# Patient Record
Sex: MALE | Race: WHITE | NOT HISPANIC OR LATINO | Employment: FULL TIME | URBAN - METROPOLITAN AREA
[De-identification: names, ages, dates, MRNs, and addresses within clinical notes are randomized per-mention and may not be internally consistent; named-entity substitution may affect disease eponyms.]

---

## 2018-01-03 ENCOUNTER — OFFICE VISIT (OUTPATIENT)
Dept: INTERNAL MEDICINE | Facility: CLINIC | Age: 23
End: 2018-01-03

## 2018-01-03 ENCOUNTER — TRANSCRIBE ORDERS (OUTPATIENT)
Dept: ADMINISTRATIVE | Facility: HOSPITAL | Age: 23
End: 2018-01-03

## 2018-01-03 ENCOUNTER — APPOINTMENT (OUTPATIENT)
Dept: LAB | Facility: HOSPITAL | Age: 23
End: 2018-01-03

## 2018-01-03 VITALS
WEIGHT: 243.1 LBS | HEIGHT: 75 IN | SYSTOLIC BLOOD PRESSURE: 128 MMHG | OXYGEN SATURATION: 98 % | DIASTOLIC BLOOD PRESSURE: 78 MMHG | HEART RATE: 76 BPM | BODY MASS INDEX: 30.23 KG/M2

## 2018-01-03 DIAGNOSIS — F17.210 CIGARETTE NICOTINE DEPENDENCE WITHOUT COMPLICATION: ICD-10-CM

## 2018-01-03 DIAGNOSIS — Z00.00 VISIT FOR WELL MAN HEALTH CHECK: Primary | ICD-10-CM

## 2018-01-03 DIAGNOSIS — Z72.51 HIGH RISK SEXUAL BEHAVIOR: ICD-10-CM

## 2018-01-03 DIAGNOSIS — Z13.220 SCREENING FOR LIPID DISORDERS: ICD-10-CM

## 2018-01-03 DIAGNOSIS — F31.81 BIPOLAR II DISORDER (HCC): Primary | ICD-10-CM

## 2018-01-03 DIAGNOSIS — Z13.29 SCREENING FOR THYROID DISORDER: ICD-10-CM

## 2018-01-03 DIAGNOSIS — Z71.6 ENCOUNTER FOR SMOKING CESSATION COUNSELING: ICD-10-CM

## 2018-01-03 DIAGNOSIS — Z13.21 ENCOUNTER FOR VITAMIN DEFICIENCY SCREENING: ICD-10-CM

## 2018-01-03 DIAGNOSIS — Z13.1 SCREENING FOR DIABETES MELLITUS: ICD-10-CM

## 2018-01-03 PROBLEM — F17.200 NICOTINE DEPENDENCE: Status: ACTIVE | Noted: 2018-01-03

## 2018-01-03 PROCEDURE — 99385 PREV VISIT NEW AGE 18-39: CPT | Performed by: FAMILY MEDICINE

## 2018-01-03 PROCEDURE — 99407 BEHAV CHNG SMOKING > 10 MIN: CPT | Performed by: FAMILY MEDICINE

## 2018-01-03 RX ORDER — LAMOTRIGINE 150 MG/1
150 TABLET ORAL DAILY
COMMUNITY

## 2018-01-03 RX ORDER — LURASIDONE HYDROCHLORIDE 40 MG/1
1 TABLET, FILM COATED ORAL DAILY
COMMUNITY

## 2018-01-03 NOTE — PROGRESS NOTES
Subjective   Levon Long is a 22 y.o. male and is here for a comprehensive physical exam. The patient reports no problems.    Patient does have concern for high risk sexual behavior.  Patient states that he's had multiple sexual partners.  He states that his ex-girlfriend did have positive chlamydia.  Patient at this time does not have any signs and symptoms of discharge.    Do you take any herbs or supplements that were not prescribed by a doctor? no      Social History:   Social History     Social History   • Marital status: Single     Spouse name: N/A   • Number of children: N/A   • Years of education: N/A     Occupational History   • Not on file.     Social History Main Topics   • Smoking status: Current Every Day Smoker     Packs/day: 0.50   • Smokeless tobacco: Never Used      Comment: since age 21   • Alcohol use Yes      Comment: 7-10/ week   • Drug use: No   • Sexual activity: Yes     Partners: Female     Other Topics Concern   • Not on file     Social History Narrative   • No narrative on file       Family History:   Family History   Problem Relation Age of Onset   • Anxiety disorder Mother    • Alcohol abuse Mother    • Anxiety disorder Father    • Anxiety disorder Maternal Grandmother        Past Medical History:   Past Medical History:   Diagnosis Date   • Anxiety    • Bipolar disorder    • Depression            Review of Systems    Review of Systems   Constitutional: Negative for chills and fever.   HENT: Negative for congestion, rhinorrhea, sinus pain and sore throat.    Eyes: Negative for photophobia and visual disturbance.   Respiratory: Negative for cough, chest tightness and shortness of breath.    Cardiovascular: Negative for chest pain and palpitations.   Gastrointestinal: Negative for diarrhea, nausea and vomiting.   Genitourinary: Negative for dysuria, frequency and urgency.   Skin: Negative for rash and wound.   Neurological: Negative for dizziness and syncope.   Psychiatric/Behavioral:  Negative for behavioral problems and confusion.       Objective   Physical Exam   Constitutional: He is oriented to person, place, and time. He appears well-developed and well-nourished.   HENT:   Head: Normocephalic and atraumatic.   Right Ear: External ear normal.   Left Ear: External ear normal.   Mouth/Throat: Oropharynx is clear and moist.   Eyes: EOM are normal.   Neck: Normal range of motion. Neck supple.   Cardiovascular: Normal rate, regular rhythm and normal heart sounds.    Pulmonary/Chest: Effort normal and breath sounds normal. No respiratory distress.   Abdominal: Soft. There is no tenderness. There is no guarding.   Musculoskeletal: Normal range of motion.   Lymphadenopathy:     He has no cervical adenopathy.   Neurological: He is alert and oriented to person, place, and time.   Skin: Skin is warm.   Psychiatric: He has a normal mood and affect. His behavior is normal.   Nursing note and vitals reviewed.      Medications:   Current Outpatient Prescriptions:   •  lamoTRIgine (LAMICTAL) 150 MG tablet, Take 150 mg by mouth Daily., Disp: , Rfl:   •  Lurasidone HCl (LATUDA) 80 MG tablet, Take 1 tablet by mouth Daily., Disp: , Rfl:        Assessment/Plan   Healthy male exam.     1. Healthcare Maintenance:  2. Patient Counseling:  --Nutrition: Stressed importance of moderation in sodium/caffeine intake, saturated fat and cholesterol, caloric balance, sufficient intake of fresh fruits, vegetables, fiber, calcium and vit D  --Exercise: Recommended patient to exercise daily for 30 minutes.  Patient does frequent the gym regularly.  --Immunizations reviewed.  Patient had a TdaP sometime in 2017.  Patient has declined flu shot.  --I have counseled patient about high risk sexual behavior.  He has been with multiple sexual partners over the last month.  He has not always used protection.  He also stated that his ex girlfriend did test positive for chlamydia.  Patient does not have any signs and symptoms of any STDs  "at this time.    --\"I advised the patient of the risks in continuing to use tobacco,\" and \"I provided this patient with smoking cessation educational materials and discussed how to quit smoking,\" and \"patient has expressed the willingness to quit\".  I have spent greater than 10 minutes with the patient and counseling on stopping to smoke.  Patient is interested in stopping to smoke, but stated that he would like to try on his own.  Patient is distended and using e-cigs to help him stop smoking.      Diagnoses and all orders for this visit:    Visit for well man health check  -     Comprehensive Metabolic Panel  -     CBC & Differential    Screening for lipid disorders  -     Lipid Panel With LDL / HDL Ratio    Screening for diabetes mellitus    Screening for thyroid disorder  -     Thyroid Panel With TSH    High risk sexual behavior  -     Hepatitis C RNA, quantitative, PCR (graph)  -     Hepatitis panel, acute  -     HIV 2 Antibody Screen w reflex  -     HSV 1 and 2 IgM, Abs, Indirect  -     HSV 1 and 2-Specific Ab, IgG  -     RPR  -     Chlamydia trachomatis, Neisseria gonorrhoeae, PCR w/ confirmation - Swab, Urine, Clean Catch    Encounter for vitamin deficiency screening  -     Vitamin D 25 Hydroxy    Cigarette nicotine dependence without complication    Encounter for smoking cessation counseling    Other orders  -     Lurasidone HCl (LATUDA) 80 MG tablet; Take 1 tablet by mouth Daily.  -     lamoTRIgine (LAMICTAL) 150 MG tablet; Take 150 mg by mouth Daily.        No Follow-up on file. follow-up in 6 months.             "

## 2018-01-08 LAB
25(OH)D3+25(OH)D2 SERPL-MCNC: 22.1 NG/ML (ref 30–100)
ALBUMIN SERPL-MCNC: 5.2 G/DL (ref 3.5–5.2)
ALBUMIN/GLOB SERPL: 1.9 G/DL
ALP SERPL-CCNC: 38 U/L (ref 39–117)
ALT SERPL-CCNC: 15 U/L (ref 1–41)
AST SERPL-CCNC: 19 U/L (ref 1–40)
BASOPHILS # BLD AUTO: 0.04 10*3/MM3 (ref 0–0.2)
BASOPHILS NFR BLD AUTO: 0.4 % (ref 0–1.5)
BILIRUB SERPL-MCNC: 0.4 MG/DL (ref 0.1–1.2)
BUN SERPL-MCNC: 14 MG/DL (ref 6–20)
BUN/CREAT SERPL: 11 (ref 7–25)
C TRACH RRNA SPEC QL NAA+PROBE: NEGATIVE
CALCIUM SERPL-MCNC: 11 MG/DL (ref 8.6–10.5)
CHLORIDE SERPL-SCNC: 96 MMOL/L (ref 98–107)
CHOLEST SERPL-MCNC: 186 MG/DL (ref 0–200)
CO2 SERPL-SCNC: 28.5 MMOL/L (ref 22–29)
CREAT SERPL-MCNC: 1.27 MG/DL (ref 0.76–1.27)
EOSINOPHIL # BLD AUTO: 0.19 10*3/MM3 (ref 0–0.7)
EOSINOPHIL NFR BLD AUTO: 1.9 % (ref 0.3–6.2)
ERYTHROCYTE [DISTWIDTH] IN BLOOD BY AUTOMATED COUNT: 12.9 % (ref 11.5–14.5)
FT4I SERPL CALC-MCNC: 1.4 (ref 1.2–4.9)
GLOBULIN SER CALC-MCNC: 2.8 GM/DL
GLUCOSE SERPL-MCNC: 81 MG/DL (ref 65–99)
HAV IGM SERPL QL IA: NEGATIVE
HBV CORE IGM SERPL QL IA: NEGATIVE
HBV SURFACE AG SERPL QL IA: NEGATIVE
HCT VFR BLD AUTO: 45.3 % (ref 40.4–52.2)
HCV AB S/CO SERPL IA: 0.1 S/CO RATIO (ref 0–0.9)
HCV RNA SERPL NAA+PROBE-ACNC: NORMAL IU/ML
HDLC SERPL-MCNC: 74 MG/DL (ref 40–60)
HGB BLD-MCNC: 14.7 G/DL (ref 13.7–17.6)
HIV 2 AB SERPL QL IA: NEGATIVE
HSV1 IGG SER IA-ACNC: <0.91 INDEX (ref 0–0.9)
HSV1 IGM TITR SER IF: NORMAL TITER
HSV2 IGG SER IA-ACNC: <0.91 INDEX (ref 0–0.9)
HSV2 IGM TITR SER IF: NORMAL TITER
IMM GRANULOCYTES # BLD: 0.03 10*3/MM3 (ref 0–0.03)
IMM GRANULOCYTES NFR BLD: 0.3 % (ref 0–0.5)
LDLC SERPL CALC-MCNC: 94 MG/DL (ref 0–100)
LDLC/HDLC SERPL: 1.27 {RATIO}
LYMPHOCYTES # BLD AUTO: 1.7 10*3/MM3 (ref 0.9–4.8)
LYMPHOCYTES NFR BLD AUTO: 16.7 % (ref 19.6–45.3)
MCH RBC QN AUTO: 30.2 PG (ref 27–32.7)
MCHC RBC AUTO-ENTMCNC: 32.5 G/DL (ref 32.6–36.4)
MCV RBC AUTO: 93 FL (ref 79.8–96.2)
MONOCYTES # BLD AUTO: 0.76 10*3/MM3 (ref 0.2–1.2)
MONOCYTES NFR BLD AUTO: 7.5 % (ref 5–12)
N GONORRHOEA RRNA SPEC QL NAA+PROBE: NEGATIVE
NEUTROPHILS # BLD AUTO: 7.44 10*3/MM3 (ref 1.9–8.1)
NEUTROPHILS NFR BLD AUTO: 73.2 % (ref 42.7–76)
PLATELET # BLD AUTO: 264 10*3/MM3 (ref 140–500)
POTASSIUM SERPL-SCNC: 4.5 MMOL/L (ref 3.5–5.2)
PROT SERPL-MCNC: 8 G/DL (ref 6–8.5)
RBC # BLD AUTO: 4.87 10*6/MM3 (ref 4.6–6)
RPR SER QL: NORMAL
SODIUM SERPL-SCNC: 137 MMOL/L (ref 136–145)
T3RU NFR SERPL: 25 % (ref 24–39)
T4 SERPL-MCNC: 5.7 UG/DL (ref 4.5–12)
TEST INFORMATION: NORMAL
TRIGL SERPL-MCNC: 89 MG/DL (ref 0–150)
TSH SERPL DL<=0.005 MIU/L-ACNC: 1.27 UIU/ML (ref 0.45–4.5)
VLDLC SERPL CALC-MCNC: 17.8 MG/DL (ref 5–40)
WBC # BLD AUTO: 10.16 10*3/MM3 (ref 4.5–10.7)

## 2018-01-09 ENCOUNTER — TELEPHONE (OUTPATIENT)
Dept: INTERNAL MEDICINE | Facility: CLINIC | Age: 23
End: 2018-01-09

## 2018-01-09 DIAGNOSIS — E83.52 SERUM CALCIUM ELEVATED: Primary | ICD-10-CM

## 2018-01-09 RX ORDER — ACETAMINOPHEN 160 MG
2000 TABLET,DISINTEGRATING ORAL DAILY
Qty: 90 CAPSULE | Refills: 1 | Status: SHIPPED | OUTPATIENT
Start: 2018-01-09

## 2018-01-09 NOTE — TELEPHONE ENCOUNTER
----- Message from Angel Styles MD sent at 1/9/2018  9:32 AM EST -----  Please inform the patient of the following abnormal results.  Hepatitis C is negative.  Hepatitis panel is negative.  HIV is negative.  Herpes simplex virus 1 and 2 are negative.  HSV 1 and 2 IgG which would indicate an old infection are both negative as well.  Syphilis is negative.  Chlamydia and gonococcal are negative.  Patient's lipid panels within normal limits.  Patient's calcium slightly elevated, is unclear exactly what elevated.  We'll need to get a CMP, and ionized calcium levels in 2 weeks.  Patient's CBC is elevated.

## 2018-01-09 NOTE — TELEPHONE ENCOUNTER
----- Message from Angel Styles MD sent at 1/9/2018  9:53 AM EST -----  Please inform the patient of the following abnormal results.  Patient's vitamin D is also low.  He will need to take 2000 IUs of vitamin D daily.

## 2018-01-09 NOTE — PROGRESS NOTES
Please inform the patient of the following abnormal results.  Patient's vitamin D is also low.  He will need to take 2000 IUs of vitamin D daily.

## 2018-01-09 NOTE — TELEPHONE ENCOUNTER
LVM- patient notified. Patient advised to contact office if they have any questions. Prescription sent to pharmacy.

## 2018-01-09 NOTE — TELEPHONE ENCOUNTER
Patient notified and voiced understanding. Patient advised to contact office if they have any questions. Lab appointment scheduled for two weeks and lab orders put into EPIC per Dr. Styles.

## 2018-01-09 NOTE — PROGRESS NOTES
Please inform the patient of the following abnormal results.  Hepatitis C is negative.  Hepatitis panel is negative.  HIV is negative.  Herpes simplex virus 1 and 2 are negative.  HSV 1 and 2 IgG which would indicate an old infection are both negative as well.  Syphilis is negative.  Chlamydia and gonococcal are negative.  Patient's lipid panels within normal limits.  Patient's calcium slightly elevated, is unclear exactly what elevated.  We'll need to get a CMP, and ionized calcium levels in 2 weeks.  Patient's CBC is elevated.

## 2018-01-23 ENCOUNTER — RESULTS ENCOUNTER (OUTPATIENT)
Dept: INTERNAL MEDICINE | Facility: CLINIC | Age: 23
End: 2018-01-23

## 2018-01-23 DIAGNOSIS — E83.52 SERUM CALCIUM ELEVATED: ICD-10-CM

## 2018-01-24 LAB
ALBUMIN SERPL-MCNC: 4.7 G/DL (ref 3.5–5.2)
ALBUMIN/GLOB SERPL: 1.7 G/DL
ALP SERPL-CCNC: 34 U/L (ref 39–117)
ALT SERPL-CCNC: 17 U/L (ref 1–41)
AST SERPL-CCNC: 18 U/L (ref 1–40)
BILIRUB SERPL-MCNC: 0.2 MG/DL (ref 0.1–1.2)
BUN SERPL-MCNC: 12 MG/DL (ref 6–20)
BUN/CREAT SERPL: 10.2 (ref 7–25)
CA-I SERPL ISE-MCNC: 6.1 MG/DL (ref 4.5–5.6)
CALCIUM SERPL-MCNC: 11 MG/DL (ref 8.6–10.5)
CHLORIDE SERPL-SCNC: 101 MMOL/L (ref 98–107)
CO2 SERPL-SCNC: 28.7 MMOL/L (ref 22–29)
CREAT SERPL-MCNC: 1.18 MG/DL (ref 0.76–1.27)
GFR SERPLBLD CREATININE-BSD FMLA CKD-EPI: 77 ML/MIN/1.73
GFR SERPLBLD CREATININE-BSD FMLA CKD-EPI: 94 ML/MIN/1.73
GLOBULIN SER CALC-MCNC: 2.7 GM/DL
GLUCOSE SERPL-MCNC: 92 MG/DL (ref 65–99)
POTASSIUM SERPL-SCNC: 5 MMOL/L (ref 3.5–5.2)
PROT SERPL-MCNC: 7.4 G/DL (ref 6–8.5)
SODIUM SERPL-SCNC: 142 MMOL/L (ref 136–145)

## 2018-01-25 NOTE — PROGRESS NOTES
Please inform the patient of the following abnormal results.  Patient's calcium and total ionized calcium continues to elevated.   Will need to repeat CMP in 2 wks, will need ionized calcium, PTH and rPTH levels as well.

## 2018-01-26 ENCOUNTER — TELEPHONE (OUTPATIENT)
Dept: INTERNAL MEDICINE | Facility: CLINIC | Age: 23
End: 2018-01-26

## 2018-01-26 DIAGNOSIS — E83.52 SERUM CALCIUM ELEVATED: Primary | ICD-10-CM

## 2018-01-26 NOTE — TELEPHONE ENCOUNTER
Patient notified and voiced understanding. Patient advised to contact office if they have any questions. Lab appointment scheduled for two weeks. Lab orders put into EPIC.

## 2018-01-26 NOTE — TELEPHONE ENCOUNTER
----- Message from Angel Styles MD sent at 1/25/2018  6:16 PM EST -----  Please inform the patient of the following abnormal results.  Patient's calcium and total ionized calcium continues to elevated.   Will need to repeat CMP in 2 wks, will need ionized calcium, PTH and rPTH levels as well.

## 2018-02-09 ENCOUNTER — RESULTS ENCOUNTER (OUTPATIENT)
Dept: INTERNAL MEDICINE | Facility: CLINIC | Age: 23
End: 2018-02-09

## 2018-02-09 DIAGNOSIS — E83.52 SERUM CALCIUM ELEVATED: ICD-10-CM

## 2018-09-04 ENCOUNTER — TELEPHONE (OUTPATIENT)
Dept: INTERNAL MEDICINE | Facility: CLINIC | Age: 23
End: 2018-09-04

## 2018-09-04 DIAGNOSIS — Z72.51 HIGH RISK SEXUAL BEHAVIOR: Primary | ICD-10-CM

## 2018-09-04 NOTE — TELEPHONE ENCOUNTER
Patient called requesting blood work orders for STD screenings. Please advise on what should be ordered.

## 2018-09-04 NOTE — TELEPHONE ENCOUNTER
Patient notified. Lab appointment scheduled. Lab orders put into EPIC. Patient advised to schedule appointment once his work schedule is released.

## 2018-09-06 ENCOUNTER — RESULTS ENCOUNTER (OUTPATIENT)
Dept: INTERNAL MEDICINE | Facility: CLINIC | Age: 23
End: 2018-09-06

## 2018-09-06 DIAGNOSIS — Z72.51 HIGH RISK SEXUAL BEHAVIOR: ICD-10-CM

## 2018-09-11 LAB
C TRACH RRNA SPEC QL NAA+PROBE: NEGATIVE
HAV IGM SERPL QL IA: NEGATIVE
HBV CORE IGM SERPL QL IA: NEGATIVE
HBV SURFACE AG SERPL QL IA: NEGATIVE
HCV AB S/CO SERPL IA: <0.1 S/CO RATIO (ref 0–0.9)
HCV RNA SERPL NAA+PROBE-ACNC: NORMAL IU/ML
HIV 2 AB SERPL QL IA: NEGATIVE
HSV1 IGG SER IA-ACNC: <0.91 INDEX (ref 0–0.9)
HSV1 IGM TITR SER IF: NORMAL TITER
HSV2 IGG SER IA-ACNC: <0.91 INDEX (ref 0–0.9)
HSV2 IGM TITR SER IF: NORMAL TITER
N GONORRHOEA RRNA SPEC QL NAA+PROBE: NEGATIVE
RPR SER QL: NON REACTIVE
TEST INFORMATION: NORMAL

## 2018-09-14 ENCOUNTER — TELEPHONE (OUTPATIENT)
Dept: INTERNAL MEDICINE | Facility: CLINIC | Age: 23
End: 2018-09-14

## 2018-09-14 NOTE — TELEPHONE ENCOUNTER
----- Message from Angel Styles MD sent at 9/14/2018  9:52 AM EDT -----  The labs were reviewed. Please inform patient that labs were normal.

## 2019-09-14 ENCOUNTER — HOSPITAL ENCOUNTER (EMERGENCY)
Facility: HOSPITAL | Age: 24
Discharge: HOME | End: 2019-09-14
Attending: EMERGENCY MEDICINE | Admitting: EMERGENCY MEDICINE

## 2019-09-14 VITALS
OXYGEN SATURATION: 100 % | RESPIRATION RATE: 16 BRPM | WEIGHT: 250 LBS | SYSTOLIC BLOOD PRESSURE: 125 MMHG | BODY MASS INDEX: 31.08 KG/M2 | TEMPERATURE: 97.9 F | HEART RATE: 56 BPM | HEIGHT: 75 IN | DIASTOLIC BLOOD PRESSURE: 77 MMHG

## 2019-09-14 DIAGNOSIS — S91.331A PUNCTURE WOUND OF RIGHT FOOT EXCLUDING TOES WITH INFECTION, INITIAL ENCOUNTER: Primary | ICD-10-CM

## 2019-09-14 DIAGNOSIS — L08.9 PUNCTURE WOUND OF RIGHT FOOT EXCLUDING TOES WITH INFECTION, INITIAL ENCOUNTER: Primary | ICD-10-CM

## 2019-09-14 PROCEDURE — 87070 CULTURE OTHR SPECIMN AEROBIC: CPT | Performed by: PHYSICIAN ASSISTANT

## 2019-09-14 PROCEDURE — 87205 SMEAR GRAM STAIN: CPT | Performed by: PHYSICIAN ASSISTANT

## 2019-09-14 PROCEDURE — 99282 EMERGENCY DEPT VISIT SF MDM: CPT | Performed by: PHYSICIAN ASSISTANT

## 2019-09-14 PROCEDURE — 87186 SC STD MICRODIL/AGAR DIL: CPT | Performed by: PHYSICIAN ASSISTANT

## 2019-09-14 PROCEDURE — 99283 EMERGENCY DEPT VISIT LOW MDM: CPT

## 2019-09-14 RX ORDER — CLINDAMYCIN HYDROCHLORIDE 300 MG/1
300 CAPSULE ORAL 4 TIMES DAILY
COMMUNITY
Start: 2019-09-11

## 2019-09-14 NOTE — ED PROVIDER NOTES
EMERGENCY DEPARTMENT ENCOUNTER      Room Number: 6/06    History is provided by the patient, no translation services needed    HPI:    Chief complaint: Infected puncture wound    Location: Between fourth and fifth toes on right foot    Quality/Severity: Mild to moderate pain    Timing/Duration: Initial injury occurred on Sunday    Modifying Factors: Began taking clindamycin on 9/11/2019, states redness has improved.    Associated Symptoms: Positive for wound on right foot, with surrounding erythema and drainage.  Patient denies any fevers, chills, myalgias, fatigue, nausea, vomiting.    Narrative: Pt is a 24 y.o. male who presents complaining of a wound between his fourth and fifth toes on his right foot that occurred on Sunday.  Patient states he was in the [facility], and something punctured the skin between his fourth and fifth toes.  He states he was seen in urgent care on 9/11/2019, had an x-ray, and had purulent drainage and erythema extending up his foot at that time.  He was started on the 21-day course of clindamycin, and has been taking it as directed 4 times daily, and has also been applying mupirocin ointment 3 times daily.  He states today he had an increased amount of purulent drainage, as well as bloody drainage from his wound.  He states he presented to an urgent care prior to arrival, and the provider told him they were concerned for streaking in his foot and to come to the ED.  Patient states upon arrival at ED, his foot appears normal to him.       PMD: Angel Styles MD    REVIEW OF SYSTEMS  Review of Systems   Constitutional: Negative for chills and fever.   Respiratory: Negative for cough and shortness of breath.    Cardiovascular: Negative for chest pain and palpitations.   Gastrointestinal: Negative for nausea and vomiting.   Genitourinary: Negative for difficulty urinating and urgency.   Musculoskeletal: Negative for gait problem and joint swelling.   Skin: Positive for color change and  wound.   Allergic/Immunologic: Negative for immunocompromised state.   Neurological: Negative for dizziness, syncope and headaches.   Psychiatric/Behavioral: Negative for confusion. The patient is not nervous/anxious.            PAST MEDICAL HISTORY  Active Ambulatory Problems     Diagnosis Date Noted   • High risk sexual behavior 01/03/2018   • Nicotine dependence 01/03/2018     Resolved Ambulatory Problems     Diagnosis Date Noted   • No Resolved Ambulatory Problems     Past Medical History:   Diagnosis Date   • Anxiety    • Bipolar disorder (CMS/HCC)    • Depression        PAST SURGICAL HISTORY  History reviewed. No pertinent surgical history.    FAMILY HISTORY  Family History   Problem Relation Age of Onset   • Anxiety disorder Mother    • Alcohol abuse Mother    • Anxiety disorder Father    • Anxiety disorder Maternal Grandmother        SOCIAL HISTORY  Social History     Socioeconomic History   • Marital status: Single     Spouse name: Not on file   • Number of children: Not on file   • Years of education: Not on file   • Highest education level: Not on file   Tobacco Use   • Smoking status: Current Every Day Smoker     Packs/day: 0.50   • Smokeless tobacco: Never Used   • Tobacco comment: since age 21   Substance and Sexual Activity   • Alcohol use: Yes     Comment: 7-10/ week   • Drug use: No   • Sexual activity: Yes     Partners: Female       ALLERGIES  Patient has no known allergies.    No current facility-administered medications for this encounter.     Current Outpatient Medications:   •  Cholecalciferol (VITAMIN D3) 2000 units capsule, Take 1 capsule by mouth Daily., Disp: 90 capsule, Rfl: 1  •  clindamycin (CLEOCIN) 300 MG capsule, Take 300 mg by mouth 4 (Four) Times a Day., Disp: , Rfl:   •  lurasidone (LATUDA) 40 MG tablet tablet, Take 1 tablet by mouth Daily. Currently weaning off per Dr. Giang, Disp: , Rfl:   •  mupirocin (BACTROBAN) 2 % ointment, Apply  topically to the appropriate area as directed  3 (Three) Times a Day., Disp: , Rfl:   •  lamoTRIgine (LAMICTAL) 150 MG tablet, Take 150 mg by mouth Daily., Disp: , Rfl:     PHYSICAL EXAM  ED Triage Vitals [09/14/19 1127]   Temp Heart Rate Resp BP SpO2   97.9 °F (36.6 °C) 56 16 125/77 100 %      Temp src Heart Rate Source Patient Position BP Location FiO2 (%)   Oral Monitor Sitting Right arm --       Physical Exam   Constitutional: He is oriented to person, place, and time and well-developed, well-nourished, and in no distress.   HENT:   Head: Normocephalic and atraumatic.   Mouth/Throat: Oropharynx is clear and moist.   Cardiovascular: Normal rate and regular rhythm.   Pulmonary/Chest: Effort normal and breath sounds normal.   Musculoskeletal: Normal range of motion. He exhibits tenderness (Tenderness between fourth and fifth digits on right foot). He exhibits no edema.        Feet:    Feet appear blotchy bilaterally, there is no red streaking present, there is localized erythema surrounding patient's wound between his fourth and fifth toes on his right foot.  There is no sign of cellulitis at this time.   Neurological: He is alert and oriented to person, place, and time. Gait normal.   Skin: Skin is warm and dry.   Psychiatric: Mood, memory, affect and judgment normal.         LAB RESULTS        I ordered the above labs and reviewed the results    RADIOLOGY  No results found.    I ordered the above radiologic testing and reviewed the results    PROCEDURES  Procedures      PROGRESS AND CONSULTS  ED Course as of Sep 15 1739   Sat Sep 14, 2019   1307 I discussed with patient that he may continue taking the clindamycin as it seems to be helping his infection improve.  I discussed that he may also do warm Betadine soaks to help draw out any excess pus.  We will culture his wound and refer him to podiatry, I have instructed him to call podiatry on Monday to be seen for this upcoming week.  Instructed patient to continue wound care and antibiotic therapy as previously  instructed.  I discussed if he has any worsening redness or swelling in his foot that he should return to the ED.  Patient verbalizes understanding and is agreeable with this plan.  [KS]   Sun Sep 15, 2019   1622 Patient's wound culture from yesterday is growing out gram-negative bacilli.  I have called in Cipro 500 mg twice daily x10 days to his pharmacy.  Patient has been contacted and is aware of the need for additional antibiotic therapy as well as the pertinent adverse effects of Cipro.  [KS]      ED Course User Index  [KS] Kimberly Avilez PA-C           MEDICAL DECISION MAKING  Results were reviewed/discussed with the patient and they were also made aware of online access. Pt also made aware that some labs, such as cultures, will not be resulted during ER visit and follow up with PMD is necessary.     MDM  Number of Diagnoses or Management Options  Puncture wound of right foot excluding toes with infection, initial encounter:      Amount and/or Complexity of Data Reviewed  Clinical lab tests: reviewed and ordered  Decide to obtain previous medical records or to obtain history from someone other than the patient: yes    Risk of Complications, Morbidity, and/or Mortality  Presenting problems: low  Diagnostic procedures: low  Management options: low    Patient Progress  Patient progress: stable      My diagnosis for lower extremity pain and injury includes but is not limited to hip fracture, femur fracture, hip dislocation, hip contusion, hip sprain, hip strain, pelvic fracture, knee sprain, patella dislocation, knee dislocation, internal derangement of knee, fractures of the femur, tibia, fibula, ankle, foot and digits, ankle sprain, ankle dislocation, Lisfranc fracture, fracture dislocations of the digits, pulmonary embolism, claudication, peripheral vascular disease, gout, osteoarthritis, rheumatoid arthritis, septic joint, poly-rheumatica, polyarthralgia and other inflammatory or infectious disease  processes.      DIAGNOSIS  Final diagnoses:   Puncture wound of right foot excluding toes with infection, initial encounter       Latest Documented Vital Signs:  As of 5:39 PM  BP- 125/77 HR- 56 Temp- 97.9 °F (36.6 °C) (Oral) O2 sat- 100%    DISPOSITION  Patient discharged home in care of his family member with instructions to follow-up with podiatry next week.    Patient/Family voiced understanding of need to follow-up for recheck, further testing as needed.  Return to the emergency Department warnings were given.         Medication List      No changes were made to your prescriptions during this visit.           Follow-up Information     Levon Parker DPM. Call in 2 days.    Specialty:  Podiatry  Why:  To schedule follow-up appointment  Contact information:  [address] [address]  [phone]                     Dictated utilizing Dragon dictation       Kimberly Avilez PA-C  09/14/19 7298       Kimberly Avilez PA-C  09/15/19 [id number]

## 2019-09-14 NOTE — DISCHARGE INSTRUCTIONS
Return to the emergency department with worsening symptoms, increased redness or swelling, fever or as needed with emergent concerns.    Continue antibiotic therapy and wound care as directed.  You may also add in warm soaks with warm water and Betadine twice daily.

## 2019-09-14 NOTE — ED NOTES
Cleaned wound on right foot with 3M wound cleanser  Applied Bacitracin, wrapped with gauze and coflex  Patient tolerated well     Deana Cardenas  09/14/19 4447

## 2019-09-18 LAB
BACTERIA SPEC AEROBE CULT: ABNORMAL
BACTERIA SPEC AEROBE CULT: ABNORMAL
GRAM STN SPEC: ABNORMAL

## 2023-06-01 ENCOUNTER — OFFICE VISIT (OUTPATIENT)
Dept: FAMILY MEDICINE CLINIC | Facility: CLINIC | Age: 28
End: 2023-06-01

## 2023-06-01 VITALS
BODY MASS INDEX: 33.07 KG/M2 | DIASTOLIC BLOOD PRESSURE: 80 MMHG | HEIGHT: 75 IN | HEART RATE: 97 BPM | SYSTOLIC BLOOD PRESSURE: 110 MMHG | RESPIRATION RATE: 19 BRPM | WEIGHT: 266 LBS | OXYGEN SATURATION: 96 %

## 2023-06-01 DIAGNOSIS — Z13.228 SCREENING FOR METABOLIC DISORDER: ICD-10-CM

## 2023-06-01 DIAGNOSIS — Z00.00 PREVENTATIVE HEALTH CARE: Primary | ICD-10-CM

## 2023-06-01 DIAGNOSIS — F41.9 ANXIETY: ICD-10-CM

## 2023-06-01 DIAGNOSIS — Z13.220 SCREENING, LIPID: ICD-10-CM

## 2023-06-01 DIAGNOSIS — Z87.891 HISTORY OF TOBACCO ABUSE: ICD-10-CM

## 2023-06-01 DIAGNOSIS — Z11.59 ENCOUNTER FOR HEPATITIS C SCREENING TEST FOR LOW RISK PATIENT: ICD-10-CM

## 2023-06-01 PROBLEM — F31.9 BIPOLAR AFFECTIVE DISORDER: Status: ACTIVE | Noted: 2017-01-13

## 2023-06-01 PROBLEM — R63.5 WEIGHT GAIN: Status: ACTIVE | Noted: 2017-01-13

## 2023-06-01 RX ORDER — FEXOFENADINE HCL AND PSEUDOEPHEDRINE HCI 60; 120 MG/1; MG/1
1 TABLET, EXTENDED RELEASE ORAL
COMMUNITY
Start: 2018-11-09 | End: 2023-06-01

## 2023-06-01 RX ORDER — BUSPIRONE HYDROCHLORIDE 7.5 MG/1
7.5 TABLET ORAL 2 TIMES DAILY
Qty: 60 TABLET | Refills: 1 | Status: SHIPPED | OUTPATIENT
Start: 2023-06-01 | End: 2023-07-01

## 2023-06-01 RX ORDER — ERGOCALCIFEROL 1.25 MG/1
CAPSULE ORAL
COMMUNITY
End: 2023-06-01

## 2023-06-01 NOTE — PROGRESS NOTES
Subjective   Levon Long is a 28 y.o. male.     History of Present Illness   New pt here to estab PCP. He has not seen PCP in > 1 year. Acute concnerns with anxiety. Recently quit nicotine    Chronic ADHD since school age. Had tried Adderall, Ritalin, on Vyvanse 70 mg  in high school and college. Has some mental health issues in college and he tapered off and started meds for bipolar. Prev on lamictal and latuda. Has been off all meds x 2-3 years. He wonders if he was ever bipolar. No longer sees therapist. He is managing symptoms w gym, diet. Is in stable relationship. Sleeps well. Would like to consider anxiety meds. He denies stressful events or situational anxiety. Some work related stress.   PHQ-2 Depression Screening  Little interest or pleasure in doing things? 0-->not at all   Feeling down, depressed, or hopeless? 0-->not at all   PHQ-2 Total Score 0   GAD7 score : 17 . Denies SI/HI  Former smoker, vaper and used nicotine pouches. Quit 2-3 weeks ago off all nicotine.     The following portions of the patient's history were reviewed and updated as appropriate: allergies, current medications, past family history, past medical history, past social history, past surgical history and problem list.    Review of Systems   Constitutional: Negative for activity change, diaphoresis, fatigue, fever, unexpected weight gain and unexpected weight loss.   HENT: Negative for congestion.         Dental exam is utd     Eyes: Negative for visual disturbance.        No contacts, occas wears glasses, no acute changes    Respiratory: Negative for cough, shortness of breath and wheezing.    Cardiovascular: Negative for chest pain, palpitations and leg swelling.   Gastrointestinal: Negative for abdominal distention, blood in stool, constipation, diarrhea and GERD.   Endocrine: Negative for cold intolerance, heat intolerance, polydipsia, polyphagia and polyuria.   Genitourinary: Negative for dysuria, scrotal swelling, testicular  pain and urinary incontinence.   Musculoskeletal: Negative for arthralgias and back pain.   Skin: Negative for rash.   Allergic/Immunologic: Negative for environmental allergies.   Neurological: Negative for seizures, syncope, weakness and headache.   Hematological: Does not bruise/bleed easily.   Psychiatric/Behavioral: Positive for stress. Negative for self-injury, sleep disturbance, suicidal ideas and depressed mood. The patient is nervous/anxious.        Objective   Physical Exam  Vitals reviewed.   Constitutional:       Appearance: Normal appearance. He is obese.   HENT:      Head: Normocephalic.      Right Ear: Tympanic membrane normal.      Left Ear: Tympanic membrane normal.      Nose: Nose normal.      Mouth/Throat:      Mouth: Mucous membranes are moist.   Eyes:      Pupils: Pupils are equal, round, and reactive to light.   Cardiovascular:      Rate and Rhythm: Normal rate and regular rhythm.      Pulses: Normal pulses.      Heart sounds: Normal heart sounds.   Pulmonary:      Effort: Pulmonary effort is normal.      Breath sounds: Normal breath sounds.   Abdominal:      General: Abdomen is flat. Bowel sounds are normal.      Palpations: Abdomen is soft.   Musculoskeletal:         General: Normal range of motion.      Cervical back: Normal range of motion.   Skin:     General: Skin is warm.   Neurological:      General: No focal deficit present.      Mental Status: He is alert.   Psychiatric:         Mood and Affect: Mood is anxious.         Vitals:    06/01/23 0905   BP: 110/80   Pulse: 97   Resp: 19   SpO2: 96%     Body mass index is 33.25 kg/m².    Procedures    Assessment & Plan   Problems Addressed this Visit    None  Visit Diagnoses     Preventative health care    -  Primary    Screening for metabolic disorder        Relevant Orders    Comprehensive Metabolic Panel    CBC & Differential    Screening, lipid        Relevant Orders    Lipid Panel With / Chol / HDL Ratio    Anxiety        Relevant Orders     Ambulatory Referral to Behavioral Health    TSH    History of tobacco abuse        Encounter for hepatitis C screening test for low risk patient        Relevant Orders    Hepatitis C Antibody      Diagnoses       Codes Comments    Preventative health care    -  Primary ICD-10-CM: Z00.00  ICD-9-CM: V70.0     Screening for metabolic disorder     ICD-10-CM: Z13.228  ICD-9-CM: V77.99     Screening, lipid     ICD-10-CM: Z13.220  ICD-9-CM: V77.91     Anxiety     ICD-10-CM: F41.9  ICD-9-CM: 300.00     History of tobacco abuse     ICD-10-CM: Z87.891  ICD-9-CM: V15.82     Encounter for hepatitis C screening test for low risk patient     ICD-10-CM: Z11.59  ICD-9-CM: V73.89         Orders Placed This Encounter   Procedures   • COVID-19 (Pfizer) Bivalent 12+yrs   • Comprehensive Metabolic Panel     Order Specific Question:   Release to patient     Answer:   Routine Release   • Hepatitis C Antibody     Order Specific Question:   Release to patient     Answer:   Routine Release   • Lipid Panel With / Chol / HDL Ratio     Order Specific Question:   Release to patient     Answer:   Routine Release   • TSH     Order Specific Question:   Release to patient     Answer:   Routine Release   • Ambulatory Referral to Behavioral Health     Referral Priority:   Routine     Referral Type:   Behavorial Health/Psych     Referral Reason:   Specialty Services Required     Referred to Provider:   Jb Purcell APRN     Requested Specialty:   Behavioral Health     Number of Visits Requested:   1   • CBC & Differential       Preventative care- Follow heart healthy diet, drink water, walk daily. Wear seatbelts, wear helmets, wear sunscreens. Follow CDC guidelines for covid pandemic.     Anxiety- declines therapy, refer psych APRN for med management, start buspirone 7.5 mg bid,  reviewed med side effect profile including suicidality, call [phone] or report to ER for severe side effects.  We can increase buspirone in 1 week if patient is  tolerating well and wants to increase.  He is to call this PCP or send [employer] message  He is considering a job change due to stress,     additional time on acute concerns/med mgmnt > 22 min  Education provided in AVS   Return in about 1 year (around 6/1/2024) for Annual physical.

## 2023-06-02 LAB
ALBUMIN SERPL-MCNC: 5 G/DL (ref 3.5–5.2)
ALBUMIN/GLOB SERPL: 2.2 G/DL
ALP SERPL-CCNC: 41 U/L (ref 39–117)
ALT SERPL-CCNC: 40 U/L (ref 1–41)
AST SERPL-CCNC: 28 U/L (ref 1–40)
BASOPHILS # BLD AUTO: 0.05 10*3/MM3 (ref 0–0.2)
BASOPHILS NFR BLD AUTO: 0.8 % (ref 0–1.5)
BILIRUB SERPL-MCNC: 0.5 MG/DL (ref 0–1.2)
BUN SERPL-MCNC: 13 MG/DL (ref 6–20)
BUN/CREAT SERPL: 12.4 (ref 7–25)
CALCIUM SERPL-MCNC: 11.3 MG/DL (ref 8.6–10.5)
CHLORIDE SERPL-SCNC: 98 MMOL/L (ref 98–107)
CHOLEST SERPL-MCNC: 231 MG/DL (ref 0–200)
CHOLEST/HDLC SERPL: 4.62 {RATIO}
CO2 SERPL-SCNC: 28.7 MMOL/L (ref 22–29)
CREAT SERPL-MCNC: 1.05 MG/DL (ref 0.76–1.27)
EGFRCR SERPLBLD CKD-EPI 2021: 99.2 ML/MIN/1.73
EOSINOPHIL # BLD AUTO: 0.22 10*3/MM3 (ref 0–0.4)
EOSINOPHIL NFR BLD AUTO: 3.7 % (ref 0.3–6.2)
ERYTHROCYTE [DISTWIDTH] IN BLOOD BY AUTOMATED COUNT: 12.9 % (ref 12.3–15.4)
GLOBULIN SER CALC-MCNC: 2.3 GM/DL
GLUCOSE SERPL-MCNC: 89 MG/DL (ref 65–99)
HCT VFR BLD AUTO: 44.3 % (ref 37.5–51)
HCV IGG SERPL QL IA: NON REACTIVE
HDLC SERPL-MCNC: 50 MG/DL (ref 40–60)
HGB BLD-MCNC: 14.7 G/DL (ref 13–17.7)
IMM GRANULOCYTES # BLD AUTO: 0.02 10*3/MM3 (ref 0–0.05)
IMM GRANULOCYTES NFR BLD AUTO: 0.3 % (ref 0–0.5)
LDLC SERPL CALC-MCNC: 151 MG/DL (ref 0–100)
LYMPHOCYTES # BLD AUTO: 1.94 10*3/MM3 (ref 0.7–3.1)
LYMPHOCYTES NFR BLD AUTO: 32.5 % (ref 19.6–45.3)
MCH RBC QN AUTO: 29.2 PG (ref 26.6–33)
MCHC RBC AUTO-ENTMCNC: 33.2 G/DL (ref 31.5–35.7)
MCV RBC AUTO: 87.9 FL (ref 79–97)
MONOCYTES # BLD AUTO: 0.65 10*3/MM3 (ref 0.1–0.9)
MONOCYTES NFR BLD AUTO: 10.9 % (ref 5–12)
NEUTROPHILS # BLD AUTO: 3.09 10*3/MM3 (ref 1.7–7)
NEUTROPHILS NFR BLD AUTO: 51.8 % (ref 42.7–76)
NRBC BLD AUTO-RTO: 0 /100 WBC (ref 0–0.2)
PLATELET # BLD AUTO: 266 10*3/MM3 (ref 140–450)
POTASSIUM SERPL-SCNC: 4.6 MMOL/L (ref 3.5–5.2)
PROT SERPL-MCNC: 7.3 G/DL (ref 6–8.5)
RBC # BLD AUTO: 5.04 10*6/MM3 (ref 4.14–5.8)
SODIUM SERPL-SCNC: 138 MMOL/L (ref 136–145)
TRIGL SERPL-MCNC: 166 MG/DL (ref 0–150)
TSH SERPL DL<=0.005 MIU/L-ACNC: 1.64 UIU/ML (ref 0.27–4.2)
VLDLC SERPL CALC-MCNC: 30 MG/DL (ref 5–40)
WBC # BLD AUTO: 5.97 10*3/MM3 (ref 3.4–10.8)

## 2023-06-05 ENCOUNTER — TELEPHONE (OUTPATIENT)
Dept: FAMILY MEDICINE CLINIC | Facility: CLINIC | Age: 28
End: 2023-06-05

## 2023-06-05 NOTE — TELEPHONE ENCOUNTER
Caller: Levon Long    Relationship to patient: Self    Best call back number: [phone]    Patient is needing: PT WAS RETURNING A CALL FROM RISHABH

## 2023-07-20 PROBLEM — F41.1 GAD (GENERALIZED ANXIETY DISORDER): Status: ACTIVE | Noted: 2023-07-20

## 2023-08-16 RX ORDER — BUSPIRONE HYDROCHLORIDE 7.5 MG/1
TABLET ORAL
Qty: 60 TABLET | Refills: 1 | OUTPATIENT
Start: 2023-08-16

## 2023-08-21 ENCOUNTER — TELEMEDICINE (OUTPATIENT)
Dept: BEHAVIORAL HEALTH | Facility: CLINIC | Age: 28
End: 2023-08-21
Payer: COMMERCIAL

## 2023-08-21 DIAGNOSIS — F98.8 ADD (ATTENTION DEFICIT DISORDER) WITHOUT HYPERACTIVITY: ICD-10-CM

## 2023-08-21 DIAGNOSIS — F31.9 BIPOLAR AFFECTIVE DISORDER, REMISSION STATUS UNSPECIFIED: ICD-10-CM

## 2023-08-21 DIAGNOSIS — F41.1 GAD (GENERALIZED ANXIETY DISORDER): Primary | ICD-10-CM

## 2023-08-21 PROCEDURE — 99214 OFFICE O/P EST MOD 30 MIN: CPT

## 2023-08-21 PROCEDURE — 96127 BRIEF EMOTIONAL/BEHAV ASSMT: CPT

## 2023-08-21 RX ORDER — BUSPIRONE HYDROCHLORIDE 10 MG/1
10 TABLET ORAL 2 TIMES DAILY
Qty: 180 TABLET | Refills: 0 | Status: SHIPPED | OUTPATIENT
Start: 2023-08-21

## 2023-08-21 NOTE — PROGRESS NOTES
Patient assessed today via MyChart through EPIC pt is at home in a secure environment and verbalizes privacy during interview. MAYO Wayne is at home in a secure environment using a secure laptop.The patient's condition being diagnosed/treated is appropriate for telemedicine.The provider identified himself as well as his credentials.The patient, and/or patient's guardian, consent to be seen remotely, and when consent is given they understand that the consent allows for patient identifiable information to be sent to a third party as needed. They may refuse to be seen remotely at any time. The electronic data is encrypted and password protected, and the patient and/or guardian has been advised of the potential risks to privacy not withstanding such measures.    [facility] PROGRESS NOTE  [facility]  [address], [place] [address]    NAME: Levon Long     : 1995   MRN: [id number]     Patient Care Team:  Cornelia Peres APRN as PCP - General (Family Medicine)  Migel Purcell APRN as Nurse Practitioner (Psychiatry)    DATE: 2023    Subjective     Chief Complaint   Patient presents with    Anxiety    Depression    Follow-up        HPI:  28 y.o. male established patient of [facility] seen today for F/U. Since last appt pt reports improvement in condition being treated, diagnosis listed below. Pt endorses daily compliance with psychiatric medications. Since last appt pt denies new medications, pt denies new medical problems. Current S/S reviewed with pt, PHQ/BENNY scores reviewed with pt and are stable. Sleep disturbance is denied, sleep is described as generally restful overall. No side effects to meds are currently reported or in evidence during assessment.  The patient would like to not adjust or change their medications at this time.                Past Psychiatric History:      Psychiatrist:  Therapist: Dr. Yoder [id number] Bipolar? Believs mis-diagnosis, HX latuday &  lamictal.  A few over the years   Admission History: [facility] 2015   Self Harm: Denies   Suicide Attempts: Denies   Trauma/Abuse: Denies     Substance Abuse History:   Types: Denies all, including illicit HX excessive THC use  Use of Alcohol: Socially  Use of Caffeine: coffee some /days   Withdrawal Symptoms: Denies   Longest Period Sober: Not Applicable    AA/NA/12 step:  Not applicable       Specialty Problems          Psychiatry Problems    ADD (attention deficit disorder) without hyperactivity        Bipolar affective disorder        BENNY (generalized anxiety disorder)          Social History     Social History Narrative    Not on file      Social History     Occupational History    Occupation: [employer] ([employer])   Tobacco Use    Smoking status: Former     Packs/day: 1.00     Years: 4.00     Pack years: 4.00     Types: Cigarettes, Electronic Cigarette     Quit date: 2023     Years since quittin.2     Passive exposure: Past    Smokeless tobacco: Never    Tobacco comments:     Nicotine Pouches most recently. Just quit.   Vaping Use    Vaping Use: Former    Substances: Nicotine    Devices: Disposable   Substance and Sexual Activity    Alcohol use: Yes     Comment: Sometimes i have 1-2 drinks on a weekday. Drink on weekend.    Drug use: No    Sexual activity: Yes     Partners: Female     Birth control/protection: Pill     Comment: My GF.     Family History   Problem Relation Age of Onset    Anxiety disorder Mother     Alcohol abuse Mother     Skin cancer Mother     Anxiety disorder Father     Melanoma Father     Anxiety disorder Maternal Grandmother       Past Medical History:   Diagnosis Date    ADHD     Anxiety     Bipolar disorder     MISDIAGNOSIS?? [facility] 3 DAYS IN     Depression        Review of Systems   Constitutional: Negative.    Respiratory:  Negative for chest tightness and shortness of breath.    Cardiovascular:  Negative for chest pain.   Neurological:  Negative for dizziness and  light-headedness.   Psychiatric/Behavioral:  Positive for stress. Negative for sleep disturbance and suicidal ideas. The patient is nervous/anxious.       Objective   PHYSICAL EXAM:  Constitutional:       Normal appearance, appears in no acute distress  Head:      Head: Normocephalic.   Pulmonary:      Effort: Pulmonary effort is normal, no cough observed   Neurological:      Mental Status: He/she/they are A/Ax3  Skin:         Skin is dry    There were no vitals taken for this visit.  No LMP for male patient.    PHQ-9 Depression Screening  Little interest or pleasure in doing things? (P) 0-->not at all   Feeling down, depressed, or hopeless? (P) 0-->not at all   Trouble falling or staying asleep, or sleeping too much? (P) 1-->several days   Feeling tired or having little energy?     Poor appetite or overeating? (P) 0-->not at all   Feeling bad about yourself/you are a failure/have let yourself or your family down? (P) 0-->not at all   Trouble concentrating on things? (P) 0-->not at all   Psychomotor agitation/retardation (P) 0-->not at all   Thoughts about death/dying/suicide (P) 0-->not at all   PHQ-9 Total Score (P) 2   How difficult have these problems for you? (P) not difficult at all     GAD7 Documentation:  Feeling nervous, anxious or on edge (P) 1   Not being able to stop or control worrying (P) 0   Worrying too much about different things (P) 2   Trouble relaxing (P) 1   Being so restless that it is hard to sit still (P) 0   Becoming easily annoyed or irritable (P) 1   Feeling Afraid as if something awful might happen (P) 0   BENNY Total Score (P) 5   How difficult have these problems made it for you? (P) Not difficult at all     MENTAL STATUS EXAM   General Appearance:  Cleanly groomed and dressed  Eye Contact:  Good eye contact  Attitude:  Cooperative  Motor Activity:  Normal gait, posture  Speech:  Normal rate, tone, volume  Mood and affect:  Normal, pleasant  Thought Process:  Goal-directed  Associations/  Thought Content:  No delusions  Hallucinations:  None  Suicidal Ideations:  Not present  Homicidal Ideation:  Not present  Orientation:  Person, place, time and situation  Fund of Knowledge:  Appropriate for age and educational level  Intellectual Functioning:  Average range  Insight:  Fair  Judgement:  Fair  Reliability:  Fair  Impulse Control:  Fair     LABS:  Common labs          6/1/2023    09:51   Common Labs   Glucose 89    BUN 13    Creatinine 1.05    Sodium 138    Potassium 4.6    Chloride 98    Calcium 11.3    Total Protein 7.3    Albumin 5.0    Total Bilirubin 0.5    Alkaline Phosphatase 41    AST (SGOT) 28    ALT (SGPT) 40    WBC 5.97    Hemoglobin 14.7    Hematocrit 44.3    Platelets 266    Total Cholesterol 231    Triglycerides 166    HDL Cholesterol 50    LDL Cholesterol  151       ALLERGY:  No Known Allergies     Current Outpatient Medications on File Prior to Visit   Medication Sig    [DISCONTINUED] busPIRone (BUSPAR) 10 MG tablet Take 1 tablet by mouth 2 (Two) Times a Day.     No current facility-administered medications on file prior to visit.      Assessment    ASSESSMENT/PLAN/INSTRUCTIONS/EDUCATION    (F41.1) BENNY (generalized anxiety disorder) - Plan: busPIRone (BUSPAR) 10 MG tablet    (F31.9) HX of Bipolar affective disorder    (F98.8) Hx of ADD (attention deficit disorder)     -The above listed condition/conditions are improved, med changes per orders. Pt instructed to monitor for improvement/worsening S/S and report to [facility] immediately. Condition will be re-assessed at next F/U appt.     -Please call the office with any worsening of symptoms or onset of intolerable side effects. Patient is agreeable to call 911 or go to the nearest ER should he/she/they have any thoughts of harm to self or others.    -Patient has been educated regarding multimodal approach with healthy nutrition, healthy sleep, regular physical activity, social activities, counseling, and medications.     PT INSTRUCTIONS FROM  AVS:  Medication changes: Continue BuSpar 10 mg twice a day with food    Return in 3 months (on 11/21/2023) for Medication Check.    Future Appointments         Provider Department Center    11/20/2023 8:00 AM Jb Purcell APRN [facility] [place]    6/3/2024 8:00 AM Cornelia Peres APRN [facility] PRIMARY [facility] [place]           Medications Discontinued During This Encounter   Medication Reason    busPIRone (BUSPAR) 10 MG tablet Reorder     New Medications Ordered This Visit   Medications    busPIRone (BUSPAR) 10 MG tablet     Sig: Take 1 tablet by mouth 2 (Two) Times a Day.     Dispense:  180 tablet     Refill:  0      No orders of the defined types were placed in this encounter.    -Barriers: Dont like taking medication and stress  -Strengths:  humor and motivated     -Progress toward goal: At goal  -Functional Status: Mild impairment   -Prognosis: Good with Ongoing Treatment        SUMMARY/DISCUSSION:  Pt past social/medical/family history reviewed/updated. ROS conducted, medications/allergies, reviewed, patient was screened today for depression/anxiety, PHQ/BENNY scores reviewed.  Most recent vitals/labs reviewed. Pt was given appropriate time to ask questions and concerns were addressed. A thorough discussion was had that included review of disease process, need for continued monitoring and additional treatment options including use of pharmacological and non-pharmacological approaches to care, decisions were made and agreed upon by patient and provider. Discussed the risks, benefits, and potential side effects of the medications; patient ackowledged and verbally consented.     Part of this note may be an electronic transcription/translation of spoken language to printed text using the Dragon Dictation System. Some of the data in this electronic note has been brought forward from a previous encounter, any necessary changes have been made, it has been reviewed by  this APRN, and it is accurate.    This document has been electronically signed by JOSE Adair August 21, 2023 12:28 EDT

## 2023-09-26 ENCOUNTER — TELEPHONE (OUTPATIENT)
Dept: FAMILY MEDICINE CLINIC | Facility: CLINIC | Age: 28
End: 2023-09-26
Payer: COMMERCIAL

## 2023-09-26 NOTE — TELEPHONE ENCOUNTER
Refill last sent in 08.21.23 for 90 day supply. Patient should not need refill until 11.21.23. Patient states he was not aware this refill was available at his [facility] for pharmacy to return my call to verify if this prescription is still available for the patient. Patient will call pharmacy as well and call our office again if he cannot  his prescription.

## 2023-09-27 ENCOUNTER — TELEPHONE (OUTPATIENT)
Dept: FAMILY MEDICINE CLINIC | Facility: CLINIC | Age: 28
End: 2023-09-27
Payer: COMMERCIAL

## 2023-09-27 NOTE — TELEPHONE ENCOUNTER
Pharmacy returned my call yesterday to state that only a 30 day supply (60 pills) were picked up last time. Patient states his girlfriend is the person who picked them up, and he is unsure why the full prescription was unable to be picked up. Pharmacy states the rest of the prescription can be picked up by the patient. Relayed this information to the patient. Patient verbalized an understanding.

## 2023-10-30 ENCOUNTER — TELEPHONE (OUTPATIENT)
Dept: FAMILY MEDICINE CLINIC | Facility: CLINIC | Age: 28
End: 2023-10-30
Payer: COMMERCIAL

## 2023-10-30 DIAGNOSIS — F41.1 GAD (GENERALIZED ANXIETY DISORDER): ICD-10-CM

## 2023-10-30 RX ORDER — BUSPIRONE HYDROCHLORIDE 10 MG/1
10 TABLET ORAL 2 TIMES DAILY
Qty: 180 TABLET | Refills: 0 | Status: SHIPPED | OUTPATIENT
Start: 2023-10-30

## 2023-10-30 NOTE — TELEPHONE ENCOUNTER
Patient called requesting a 30 day supply [phone]

## 2023-11-16 ENCOUNTER — TELEPHONE (OUTPATIENT)
Dept: FAMILY MEDICINE CLINIC | Facility: CLINIC | Age: 28
End: 2023-11-16
Payer: COMMERCIAL

## 2023-12-04 ENCOUNTER — TELEMEDICINE (OUTPATIENT)
Dept: BEHAVIORAL HEALTH | Facility: CLINIC | Age: 28
End: 2023-12-04
Payer: COMMERCIAL

## 2023-12-04 DIAGNOSIS — F41.1 GAD (GENERALIZED ANXIETY DISORDER): Primary | ICD-10-CM

## 2023-12-04 DIAGNOSIS — R45.4 IRRITABILITY: ICD-10-CM

## 2023-12-04 RX ORDER — BUSPIRONE HYDROCHLORIDE 10 MG/1
10 TABLET ORAL 2 TIMES DAILY
Qty: 60 TABLET | Refills: 5 | Status: SHIPPED | OUTPATIENT
Start: 2023-12-04

## 2023-12-04 NOTE — PROGRESS NOTES
Patient assessed today via MyChart through [facility] pt is at home in a secure environment and verbalizes privacy during interview. MAYO Wayne is at home in a secure environment using a secure laptop.The patient's condition being diagnosed/treated is appropriate for telemedicine.The provider identified himself as well as his credentials.The patient, and/or patient's guardian, consent to be seen remotely, and when consent is given they understand that the consent allows for patient identifiable information to be sent to a third party as needed. They may refuse to be seen remotely at any time. The electronic data is encrypted and password protected, and the patient and/or guardian has been advised of the potential risks to privacy not withstanding such measures.    [facility] PROGRESS NOTE  [facility]  [address], [place] [address]    NAME: Levon Long     : 1995   MRN: [id number]     DATE: 2023    Subjective     Chief Complaint   Patient presents with    Anxiety    irritability    Follow-up      HPI:  28 y.o. male established patient of Tone Purcell PMVALERIE seen today for F/U.  Last seen roughly 3 months ago, no medication changes at that time, since then patient reports no major changes things have been going pretty good at home and at work, recently got engaged they have a date set for 2024, patient works out roughly 5 days a week has begun doing intermittent fasting for weight maintenance, patient reports he likes BuSpar no major side effects after some minor dizziness that occurs for around 10 minutes after first taking it, can notice it more on an empty stomach, patient describes sleep as good overall, no new medications or new medical problems outside of a minor cold that is resolving with over-the-counter meds, patient not currently seeing a therapist.            Social History     Occupational History    Occupation: [employer] ([employer])   Tobacco Use     Smoking status: Former     Packs/day: 1.00     Years: 4.00     Additional pack years: 0.00     Total pack years: 4.00     Types: Cigarettes, Electronic Cigarette     Quit date: 2023     Years since quittin.5     Passive exposure: Past    Smokeless tobacco: Never    Tobacco comments:     Nicotine Pouches most recently. Just quit.   Vaping Use    Vaping Use: Former    Substances: Nicotine    Devices: Disposable   Substance and Sexual Activity    Alcohol use: Yes     Comment: Sometimes i have 1-2 drinks on a weekday. Drink on weekend.    Drug use: No    Sexual activity: Yes     Partners: Female     Birth control/protection: Pill     Comment: My GF.     Family History   Problem Relation Age of Onset    Anxiety disorder Mother     Alcohol abuse Mother     Skin cancer Mother     Anxiety disorder Father     Melanoma Father     Anxiety disorder Maternal Grandmother       Past Medical History:   Diagnosis Date    ADHD     Anxiety     Bipolar disorder     MISDIAGNOSIS?? OLOP 3 DAYS IN     Depression      Review of Systems   Constitutional: Negative.  Negative for diaphoresis and fever.   HENT:  Positive for congestion.    Respiratory:  Negative for chest tightness and shortness of breath.    Cardiovascular:  Negative for chest pain.   Neurological:  Negative for dizziness and light-headedness.   Psychiatric/Behavioral:  Negative for agitation, sleep disturbance and suicidal ideas. The patient is not nervous/anxious.       Objective   PHYSICAL EXAM:  Constitutional:       Normal appearance, appears in no acute distress  Head:      Head: Normocephalic.   Pulmonary:      Effort: Pulmonary effort is normal, no cough observed   Neurological:      Mental Status: He/she/they are A/Ax3  Skin:         Skin is dry    There were no vitals taken for this visit.  No LMP for male patient.    PHQ-9 Depression Screening  Little interest or pleasure in doing things? (P) 0-->not at all   Feeling down, depressed, or hopeless? (P)  0-->not at all   Trouble falling or staying asleep, or sleeping too much? (P) 0-->not at all   Feeling tired or having little energy?     Poor appetite or overeating? (P) 1-->several days   Feeling bad about yourself/you are a failure/have let yourself or your family down? (P) 0-->not at all   Trouble concentrating on things? (P) 1-->several days   Psychomotor agitation/retardation (P) 0-->not at all   Thoughts about death/dying/suicide (P) 0-->not at all   PHQ-9 Total Score (P) 3   How difficult have these problems for you? (P) somewhat difficult     GAD7 Documentation:  Feeling nervous, anxious or on edge (P) 1   Not being able to stop or control worrying (P) 0   Worrying too much about different things (P) 1   Trouble relaxing (P) 0   Being so restless that it is hard to sit still (P) 0   Becoming easily annoyed or irritable (P) 1   Feeling Afraid as if something awful might happen (P) 1   BENNY Total Score (P) 4   How difficult have these problems made it for you? (P) Somewhat difficult     MENTAL STATUS EXAM   General Appearance:  Cleanly groomed and dressed  Eye Contact:  Good eye contact  Attitude:  Cooperative  Motor Activity:  Normal posture  Speech:  Normal rate, tone, volume  Mood and affect:  Normal, pleasant  Thought Process:  Goal-directed  Associations/ Thought Content:  No delusions  Hallucinations:  None  Suicidal Ideations:  Not present  Homicidal Ideation:  Not present  Sensorium:  Alert  Orientation:  Person, place, time and situation  Attention Span/ Concentration:  Good  Fund of Knowledge:  Appropriate for age and educational level  Intellectual Functioning:  Average range  Insight:  Fair  Judgement:  Fair  Reliability:  Fair     LABS:  Common labs          6/1/2023    09:51   Common Labs   Glucose 89    BUN 13    Creatinine 1.05    Sodium 138    Potassium 4.6    Chloride 98    Calcium 11.3    Total Protein 7.3    Albumin 5.0    Total Bilirubin 0.5    Alkaline Phosphatase 41    AST (SGOT) 28     ALT (SGPT) 40    WBC 5.97    Hemoglobin 14.7    Hematocrit 44.3    Platelets 266    Total Cholesterol 231    Triglycerides 166    HDL Cholesterol 50    LDL Cholesterol  151       ALLERGY:  No Known Allergies     Current Outpatient Medications on File Prior to Visit   Medication Sig    [DISCONTINUED] busPIRone (BUSPAR) 10 MG tablet Take 1 tablet by mouth 2 (Two) Times a Day.     No current facility-administered medications on file prior to visit.      Assessment    ASSESSMENT/PLAN/INSTRUCTIONS/EDUCATION    (F41.1) BENNY (generalized anxiety disorder) - Plan: busPIRone (BUSPAR) 10 MG tablet    (R45.4) Irritability - Plan: busPIRone (BUSPAR) 10 MG tablet     -Stable/improved with current treatment. Pt reports daily compliance with medications, denies side effects.   -Continue BuSpar 10 mg twice daily for anxiety/irritability  -Continue regular exercise 5 days a week plus intermittent fasting  -Agreed to follow-up in 6 months if still stable at that time we will move to every 12-month appointments.    Return in 6 months (on 6/4/2024) for Video visit, NO MED CHANGES.    Medications Discontinued During This Encounter   Medication Reason    busPIRone (BUSPAR) 10 MG tablet Reorder       New Medications Ordered This Visit   Medications    busPIRone (BUSPAR) 10 MG tablet     Sig: Take 1 tablet by mouth 2 (Two) Times a Day.     Dispense:  60 tablet     Refill:  5        No orders of the defined types were placed in this encounter.    -Barriers: Dont like taking medication and stress  -Strengths:  humor and motivated     -Progress toward goal: At goal  -Functional Status: Mild impairment   -Prognosis: Good with Ongoing Treatment        SUMMARY/DISCUSSION:  Pt past social/medical/family history reviewed/updated. ROS conducted, medications/allergies, reviewed, patient was screened today for depression/anxiety, PHQ/BENNY scores reviewed.  Most recent vitals/labs reviewed. Pt was given appropriate time to ask questions and concerns  were addressed. A thorough discussion was had that included review of disease process, need for continued monitoring and additional treatment options including use of pharmacological and non-pharmacological approaches to care, decisions were made and agreed upon by patient and provider. Discussed the risks, benefits, and potential side effects of the medications; patient ackowledged and verbally consented.     Part of this note may be an electronic transcription/translation of spoken language to printed text using the Dragon Dictation System. Some of the data in this electronic note has been brought forward from a previous encounter, any necessary changes have been made, it has been reviewed by this APRN, and it is accurate.    This document has been electronically signed by JOSE Adair December 4, 2023 09:17 EST

## 2023-12-04 NOTE — PATIENT INSTRUCTIONS
GENERAL NEW PATIENT INSTRUCTIONS:  -The best way to get a hold of Tone is to call the office at [phone] and ask to leave a message with his medical assistant.  Patient's are not able to message their mental health provider directly via [employer], please give my office up to 48 hours to respond to a patient call/question/refill request.  -Please call 911 or go to the nearest ER if you begin to have thoughts of harming yourself or other people.  -Refill requests will be made during normal office hours, Monday-Friday 8:00-5:30.  Tone is out of the office on Wednesdays and weekends.  Follow-up appointments must be maintained in order for prescribing to continue.    -Tuesdays and Thursdays are Tone's in-office days, Mondays and Fridays are his telehealth days.  The decision to be seen virtually or in person is up to the discretion of the provider, not all behavioral health problems are appropriate for telehealth.    NO SHOW POLICY:  We understand unexpected circumstances arise; however, anytime you miss your appointment we are unable to provide you appropriate care.  In addition, each appointment missed could have been used to provide care for others.  We ask that you call at least 24 hours in advance to cancel or reschedule an appointment. We would like to take this opportunity to remind you of our policy stating patients who miss THREE or more appointments without cancelling or rescheduling 24 hours in advance of the appointment may be subject to cancellation of any further visits with our clinic and recommendation to seek in-person services/visits. Please call [phone] to reschedule your appointment. If there are reasons that make it difficult for you to keep the appointments, please call and let us know how we can help. Please understand that medication prescribing will not continue without seeing your provider.       [facility] [address] [place] [address]  P: [phone]  F:  [phone]

## 2023-12-29 ENCOUNTER — TELEPHONE (OUTPATIENT)
Dept: FAMILY MEDICINE CLINIC | Facility: CLINIC | Age: 28
End: 2023-12-29
Payer: COMMERCIAL

## 2023-12-29 DIAGNOSIS — F41.1 GAD (GENERALIZED ANXIETY DISORDER): ICD-10-CM

## 2023-12-29 DIAGNOSIS — R45.4 IRRITABILITY: ICD-10-CM

## 2023-12-29 RX ORDER — BUSPIRONE HYDROCHLORIDE 10 MG/1
10 TABLET ORAL 2 TIMES DAILY
Qty: 60 TABLET | Refills: 5 | Status: SHIPPED | OUTPATIENT
Start: 2023-12-29

## 2023-12-29 NOTE — TELEPHONE ENCOUNTER
Pt called requesting a refill on Buspar 10mg sent to the oger on [address] @ [facility]. It states there are 5 refills on this script but the pharmacy is holding it up for some reason.

## 2024-06-03 ENCOUNTER — TELEMEDICINE (OUTPATIENT)
Dept: BEHAVIORAL HEALTH | Facility: CLINIC | Age: 29
End: 2024-06-03
Payer: COMMERCIAL

## 2024-06-03 ENCOUNTER — OFFICE VISIT (OUTPATIENT)
Dept: FAMILY MEDICINE CLINIC | Facility: CLINIC | Age: 29
End: 2024-06-03
Payer: COMMERCIAL

## 2024-06-03 VITALS
OXYGEN SATURATION: 98 % | RESPIRATION RATE: 18 BRPM | SYSTOLIC BLOOD PRESSURE: 122 MMHG | HEART RATE: 74 BPM | HEIGHT: 75 IN | BODY MASS INDEX: 35.63 KG/M2 | DIASTOLIC BLOOD PRESSURE: 70 MMHG | WEIGHT: 286.6 LBS

## 2024-06-03 DIAGNOSIS — F41.1 GAD (GENERALIZED ANXIETY DISORDER): ICD-10-CM

## 2024-06-03 DIAGNOSIS — E66.09 CLASS 2 OBESITY DUE TO EXCESS CALORIES WITHOUT SERIOUS COMORBIDITY WITH BODY MASS INDEX (BMI) OF 35.0 TO 35.9 IN ADULT: ICD-10-CM

## 2024-06-03 DIAGNOSIS — E83.52 SERUM CALCIUM ELEVATED: ICD-10-CM

## 2024-06-03 DIAGNOSIS — R45.4 IRRITABILITY: ICD-10-CM

## 2024-06-03 DIAGNOSIS — F41.1 GAD (GENERALIZED ANXIETY DISORDER): Primary | ICD-10-CM

## 2024-06-03 DIAGNOSIS — Z00.00 ANNUAL PHYSICAL EXAM: Primary | ICD-10-CM

## 2024-06-03 DIAGNOSIS — F41.9 ANXIETY: ICD-10-CM

## 2024-06-03 PROBLEM — E66.812 CLASS 2 OBESITY DUE TO EXCESS CALORIES WITHOUT SERIOUS COMORBIDITY WITH BODY MASS INDEX (BMI) OF 35.0 TO 35.9 IN ADULT: Status: ACTIVE | Noted: 2024-06-03

## 2024-06-03 PROCEDURE — 99395 PREV VISIT EST AGE 18-39: CPT | Performed by: NURSE PRACTITIONER

## 2024-06-03 PROCEDURE — 99213 OFFICE O/P EST LOW 20 MIN: CPT

## 2024-06-03 RX ORDER — BUSPIRONE HYDROCHLORIDE 10 MG/1
10 TABLET ORAL 2 TIMES DAILY
Qty: 180 TABLET | Refills: 3 | Status: SHIPPED | OUTPATIENT
Start: 2024-06-03 | End: 2024-09-01

## 2024-06-03 NOTE — PROGRESS NOTES
Subjective   Levon Long is a 29 y.o. male.   Patient here for annual physical exam, labs, chronic illness management and updated screening.      History of Present Illness   Chronic anxiety x years, he is managed well on buspirone 10 mg bid. He denies SI/HI. PHQ-2 Depression Screening  Little interest or pleasure in doing things?  0   Feeling down, depressed, or hopeless?  0   PHQ-2 Total Score  0   He is going to gym 4-5 days /week. Playing golf. Sleeping well.     Chronic obesity x years. He is lifting weights and more active. BMI 35. He tries to track his intake. He has worn mouth guard, has some snoring. He feels well rested. He denies freq waking,     History of elevated calcium. He is not taking TUMS or any additional supplements. No history of vit D deficiency or thyroid/parathyroid issues. No bone issues. He is working out and lifting weights.       The following portions of the patient's history were reviewed and updated as appropriate: allergies, current medications, past family history, past medical history, past social history, past surgical history and problem list.    Review of Systems   Constitutional:  Negative for activity change, diaphoresis, fatigue, fever and unexpected weight loss.   HENT:  Negative for congestion.         Dental exam is utd      Eyes:  Negative for visual disturbance.        No vision issues     Respiratory:  Negative for cough, shortness of breath and wheezing.    Cardiovascular:  Negative for chest pain, palpitations and leg swelling.   Gastrointestinal:  Negative for abdominal distention, blood in stool, constipation, diarrhea and GERD.   Endocrine: Negative for cold intolerance, heat intolerance, polydipsia, polyphagia and polyuria.   Genitourinary:  Negative for dysuria, erectile dysfunction and urinary incontinence.   Musculoskeletal:  Negative for arthralgias and back pain.   Skin:  Negative for rash.        Sees derm, family hx melanoma   Allergic/Immunologic:  Negative for environmental allergies.   Neurological:  Negative for dizziness, seizures, weakness and headache.   Hematological:  Negative for adenopathy. Does not bruise/bleed easily.   Psychiatric/Behavioral:  Negative for sleep disturbance, depressed mood and stress. The patient is nervous/anxious.          Current Outpatient Medications:     busPIRone (BUSPAR) 10 MG tablet, Take 1 tablet by mouth 2 (Two) Times a Day for 90 days., Disp: 180 tablet, Rfl: 3    Objective   Physical Exam  Vitals reviewed.   Constitutional:       Appearance: Normal appearance. He is obese.   HENT:      Head: Normocephalic.      Right Ear: Tympanic membrane normal.      Left Ear: Tympanic membrane normal.      Nose: Nose normal.      Mouth/Throat:      Mouth: Mucous membranes are moist.   Eyes:      Pupils: Pupils are equal, round, and reactive to light.   Cardiovascular:      Rate and Rhythm: Normal rate and regular rhythm.      Pulses: Normal pulses.      Heart sounds: Normal heart sounds.   Pulmonary:      Effort: Pulmonary effort is normal.      Breath sounds: Normal breath sounds.   Abdominal:      General: Bowel sounds are normal.      Palpations: Abdomen is soft.   Musculoskeletal:         General: Normal range of motion.      Cervical back: Normal range of motion.   Skin:     General: Skin is warm.   Neurological:      General: No focal deficit present.      Mental Status: He is alert.   Psychiatric:         Mood and Affect: Mood normal. Mood is anxious.         Vitals:    06/03/24 0802   BP: 122/70   Pulse: 74   Resp: 18   SpO2: 98%     Body mass index is 35.82 kg/m².    Procedures    TSH   Date Value Ref Range Status   06/01/2023 1.640 0.270 - 4.200 uIU/mL Final         Assessment & Plan   Problems Addressed this Visit       BENNY (generalized anxiety disorder)    Relevant Medications    busPIRone (BUSPAR) 10 MG tablet    Class 2 obesity due to excess calories without serious comorbidity with body mass index (BMI) of 35.0 to  35.9 in adult     Patient's (Body mass index is 35.82 kg/m².) indicates that they are obese (BMI >30) with health conditions that include none . Weight is newly identified. BMI  is above average; BMI management plan is completed. We discussed portion control, increasing exercise, joining a fitness center or start home based exercise program, and an marco a-based approach such as "[employer] Pal or Lose It.          Relevant Orders    Lipid Panel With / Chol / HDL Ratio     Other Visit Diagnoses       Annual physical exam    -  Primary    Relevant Orders    Comprehensive Metabolic Panel    CBC & Differential    Lipid Panel With / Chol / HDL Ratio    Anxiety        Irritability        Relevant Medications    busPIRone (BUSPAR) 10 MG tablet    Serum calcium elevated        Relevant Orders    Comprehensive Metabolic Panel    CBC & Differential    Vitamin D 25 hydroxy    PTH, Intact          Diagnoses         Codes Comments    Annual physical exam    -  Primary ICD-10-CM: Z00.00  ICD-9-CM: V70.0     Anxiety     ICD-10-CM: F41.9  ICD-9-CM: 300.00     BENNY (generalized anxiety disorder)     ICD-10-CM: F41.1  ICD-9-CM: 300.02     Irritability     ICD-10-CM: R45.4  ICD-9-CM: 799.22     Class 2 obesity due to excess calories without serious comorbidity with body mass index (BMI) of 35.0 to 35.9 in adult     ICD-10-CM: E66.09, Z68.35  ICD-9-CM: 278.00, V85.35     Serum calcium elevated     ICD-10-CM: E83.52  ICD-9-CM: 275.42             Preventative care- Follow heart healthy diet, drink water, walk daily. Wear seatbelts, wear helmets, wear sunscreens. Follow CDC guidelines for covid and flu .     Anxiety- cw buspirone 10 mg bid, work on diet, exercise, quality sleep, limit stressors and stimulants    Elevated calcium- check labs     Obesity- work on diet, exercise, more water, more fiber, limit alcohol use , cw weight lifting        Education provided in AVS   Return in about 1 year (around 6/3/2025) for Annual physical.

## 2024-06-03 NOTE — PATIENT INSTRUCTIONS
GENERAL NEW PATIENT INSTRUCTIONS:    -Please arrive in person or virtually 10-15 minutes prior to appointment to allow for registration/sign in/questionnaires. If you are seen virtually please review after visit summary (AVS)/patient instructions via [employer] for a summary of plan of care/changes in treatment plan. If you are having difficulties logging on or accessing [employer] please contact my office for assistance.    -The best way to get a hold of Tone is to call the office at [phone] and ask to leave a message with his medical assistant. Tone Purcell is a Psychiatric Mental Health Nurse Practitioner, due to his specialty patient's are not able to message him directly via [employer]. Please give my office up to 48 hours to respond to a patient call/question/refill request. Refill requests will be made during normal office hours only, Monday-Friday 8:00-5:30.      -Tone is out of the office on Fridays and weekends. Tuesdays and Thursdays are Tone's in-office days, Mondays and Wednesdays are his telehealth days.  The decision to be seen virtually or in person is up to the discretion of the provider, not all behavioral health problems are appropriate for telehealth.    -Please call the office at [phone] with any worsening of symptoms or onset of intolerable side effects.  -Follow-up appointments must be maintained in order for prescribing to continue.  -Patient has been educated regarding multimodal approach with healthy nutrition, healthy sleep, regular physical activity, social activities, counseling, and medications.   -Please call 911 or go to the nearest ER if you begin to have thoughts of harming yourself or other people.    No show policy:  We understand unexpected circumstances arise; however, anytime you miss your appointment we are unable to provide you appropriate care.  In addition, each appointment missed could have been used to provide care for others.  We ask that you call at least 24 hours in  advance to cancel or reschedule an appointment. We would like to take this opportunity to remind you of our policy stating patients who miss THREE or more appointments without cancelling or rescheduling 24 hours in advance of the appointment may be subject to cancellation of any further visits with our clinic and recommendation to seek in-person services/visits. Please call [phone] to reschedule your appointment. If there are reasons that make it difficult for you to keep the appointments, please call and let us know how we can help. Please understand that medication prescribing will not continue without seeing your provider.

## 2024-06-03 NOTE — PROGRESS NOTES
Patient assessed today via MyChart through [facility] pt is IN HIS CAR in a secure environment and verbalizes privacy during interview. MAYO Wayne is at home in a secure environment using a secure laptop.The patient's condition being diagnosed/treated is appropriate for telemedicine.The provider identified himself as well as his credentials.The patient, and/or patient's guardian, consent to be seen remotely, and when consent is given they understand that the consent allows for patient identifiable information to be sent to a third party as needed. They may refuse to be seen remotely at any time. The electronic data is encrypted and password protected, and the patient and/or guardian has been advised of the potential risks to privacy not withstanding such measures.    Subjective     Chief Complaint   Patient presents with    Anxiety     HPI:  Levon Long, 29 y.o. pt presents to [facility] on 06/03/2024 for F/U, pt seen today for psychiatric med management of Anxiety . Pt last seen roughly 6 months prior, no med changes at that time.    Overall pt reports things are good, believes BuSpar is helping takes at 7 AM and 6 PM daily no side effects reported outside of some mild dizziness if he takes on empty stomach that resolves, patient describes sleeping well, patient working out 4 to 5 days a week for upcoming wedding in November they will be going to [place] for a week long honeymoon after, patient reports things with his wife are going well, reports things at his [employer] company where he works is going well, patient denies new medications or new medical problems, patient saw PCP today for his physical it appears PCP reordered BuSpar, refills offered today patient reports he is fine, agreed to follow-up in 1 year, appointment scheduled.    PHQ9: (P) 3  GAD7: (P) 4    Social History     Occupational History    Occupation: [employer]   Tobacco Use    Smoking status: Former     Current packs/day: 0.00      Average packs/day: 1 pack/day for 4.0 years (4.0 ttl pk-yrs)     Types: Cigarettes, Electronic Cigarette     Start date: 2019     Quit date: 2023     Years since quittin.0     Passive exposure: Past    Smokeless tobacco: Never    Tobacco comments:     Nicotine Pouches most recently. Just quit.   Vaping Use    Vaping status: Former    Substances: Nicotine    Devices: Disposable   Substance and Sexual Activity    Alcohol use: Yes     Comment: Sometimes i have 1-2 drinks on a weekday. Drink on weekend.    Drug use: No    Sexual activity: Yes     Partners: Female     Birth control/protection: Pill     Comment: My GF.      Past Medical History:   Diagnosis Date    ADHD     Anxiety     Bipolar disorder     MISDIAGNOSIS?? OLOP 3 DAYS IN     Depression      Past Medical History Pertinent Negatives:   Diagnosis Date Noted    Suicide attempt 2023     Review of Systems   Constitutional: Negative.    Respiratory:  Negative for chest tightness and shortness of breath.    Cardiovascular:  Negative for chest pain.   Neurological:  Positive for dizziness. Negative for light-headedness.   Psychiatric/Behavioral:  Negative for sleep disturbance and suicidal ideas.       Objective   PHYSICAL EXAM:  Constitutional:       Normal appearance, appears in no acute distress  Head:      Head: Normocephalic.   Pulmonary:      Effort: Pulmonary effort is normal, no cough observed   Neurological:      Mental Status: He is A/Ax3  Skin:         Skin is dry    There were no vitals taken for this visit.  No LMP for male patient.    Wt Readings from Last 3 Encounters:   24 130 kg (286 lb 9.6 oz)   23 121 kg (265 lb 12.8 oz)   23 121 kg (266 lb)     BP Readings from Last 3 Encounters:   24 122/70   23 122/70   23 110/80     Pulse Readings from Last 3 Encounters:   24 74   23 75   23 97     PHQ-9 Depression Screening  Little interest or pleasure in doing things? (P) 0-->not at  all   Feeling down, depressed, or hopeless? (P) 0-->not at all   Trouble falling or staying asleep, or sleeping too much? (P) 0-->not at all   Feeling tired or having little energy?     Poor appetite or overeating? (P) 2-->more than half the days   Feeling bad about yourself/you are a failure/have let yourself or your family down? (P) 0-->not at all   Trouble concentrating on things? (P) 0-->not at all   Psychomotor agitation/retardation (P) 0-->not at all   Thoughts about death/dying/suicide (P) 0-->not at all   PHQ-9 Total Score (P) 3   How difficult have these problems for you? (P) somewhat difficult     GAD7 Documentation:  Feeling nervous, anxious or on edge (P) 0   Not being able to stop or control worrying (P) 0   Worrying too much about different things (P) 1   Trouble relaxing (P) 2   Being so restless that it is hard to sit still (P) 0   Becoming easily annoyed or irritable (P) 1   Feeling Afraid as if something awful might happen (P) 0   BENNY Total Score (P) 4   How difficult have these problems made it for you? (P) Somewhat difficult     MENTAL STATUS EXAM   General Appearance:  Cleanly groomed and dressed  Eye Contact:  Good eye contact  Attitude:  Cooperative  Motor Activity:  Normal posture  Speech:  Normal rate, tone, volume  Mood and affect:  Normal, pleasant  Thought Process:  Goal-directed  Associations/ Thought Content:  No delusions  Hallucinations:  None  Suicidal Ideations:  Not present  Homicidal Ideation:  Not present  Sensorium:  Alert  Orientation:  Person, place, time and situation  Attention Span/ Concentration:  Good  Fund of Knowledge:  Appropriate for age and educational level  Intellectual Functioning:  Average range  Insight:  Fair  Judgement:  Fair  Reliability:  Fair     LABS:       ALLERGY:  No Known Allergies     Current Outpatient Medications on File Prior to Visit   Medication Sig    busPIRone (BUSPAR) 10 MG tablet Take 1 tablet by mouth 2 (Two) Times a Day for 90 days.     [DISCONTINUED] busPIRone (BUSPAR) 10 MG tablet Take 1 tablet by mouth 2 (Two) Times a Day.     No current facility-administered medications on file prior to visit.      Assessment    ASSESSMENT/PLAN/INSTRUCTIONS/EDUCATION    (F41.1) BENNY (generalized anxiety disorder) - stable - Plan: continue buspar 10 mg bid  -     FOLLOW UP: Return in about 1 year (around 6/3/2025) for Recheck.    There are no discontinued medications.         No orders of the defined types were placed in this encounter.    -Barriers: Dont like taking medication and stress  -Strengths:  humor and motivated     -Short-Term Goals: Pt will be compliant with medication management and note improvement in S/S over the next 4 to 6 weeks or at next scheduled visit.  -Long-Term Goals: Pt will be compliant with the agreed treatment plan including medication regimen & F/U appt's and deny impairment in daily functioning over the next 6 months.      -Progress toward goal: At goal  -Functional Status: Mild impairment   -Prognosis: Good with Ongoing Treatment        SUMMARY/DISCUSSION:  Pt past social/medical/family history reviewed/updated. ROS conducted, medications/allergies, reviewed, patient was screened today for depression/anxiety, PHQ/BENNY scores reviewed.  Most recent vitals/labs reviewed. Pt was given appropriate time to ask questions and concerns were addressed. A thorough discussion was had that included review of disease process, need for continued monitoring and additional treatment options including use of pharmacological and non-pharmacological approaches to care, decisions were made and agreed upon by patient and provider. Discussed the risks, benefits, and potential side effects of the medications; patient ackowledged and verbally consented.     Part of this note may be an electronic transcription/translation of spoken language to printed text using the Dragon Dictation System. Some of the data in this electronic note has been brought forward  from a previous encounter, any necessary changes have been made, it has been reviewed by this APRN, and it is accurate.    This document has been electronically signed by JOSE Adair Soumya 3, 2024 09:18 EDT

## 2024-06-03 NOTE — ASSESSMENT & PLAN NOTE
Patient's (Body mass index is 35.82 kg/m².) indicates that they are obese (BMI >30) with health conditions that include none . Weight is newly identified. BMI  is above average; BMI management plan is completed. We discussed portion control, increasing exercise, joining a fitness center or start home based exercise program, and an marco a-based approach such as Character Booster Pal or Lose It.

## 2024-06-04 LAB
25(OH)D3+25(OH)D2 SERPL-MCNC: 28.5 NG/ML (ref 30–100)
ALBUMIN SERPL-MCNC: 4.4 G/DL (ref 3.5–5.2)
ALBUMIN/GLOB SERPL: 2.1 G/DL
ALP SERPL-CCNC: 38 U/L (ref 39–117)
ALT SERPL-CCNC: 59 U/L (ref 1–41)
AST SERPL-CCNC: 49 U/L (ref 1–40)
BASOPHILS # BLD AUTO: 0.04 10*3/MM3 (ref 0–0.2)
BASOPHILS NFR BLD AUTO: 0.7 % (ref 0–1.5)
BILIRUB SERPL-MCNC: 0.5 MG/DL (ref 0–1.2)
BUN SERPL-MCNC: 11 MG/DL (ref 6–20)
BUN/CREAT SERPL: 10.2 (ref 7–25)
CALCIUM SERPL-MCNC: 10.3 MG/DL (ref 8.6–10.5)
CHLORIDE SERPL-SCNC: 105 MMOL/L (ref 98–107)
CHOLEST SERPL-MCNC: 215 MG/DL (ref 0–200)
CHOLEST/HDLC SERPL: 4.22 {RATIO}
CO2 SERPL-SCNC: 25.3 MMOL/L (ref 22–29)
CREAT SERPL-MCNC: 1.08 MG/DL (ref 0.76–1.27)
EGFRCR SERPLBLD CKD-EPI 2021: 95.3 ML/MIN/1.73
EOSINOPHIL # BLD AUTO: 0.33 10*3/MM3 (ref 0–0.4)
EOSINOPHIL NFR BLD AUTO: 6.2 % (ref 0.3–6.2)
ERYTHROCYTE [DISTWIDTH] IN BLOOD BY AUTOMATED COUNT: 12.5 % (ref 12.3–15.4)
GLOBULIN SER CALC-MCNC: 2.1 GM/DL
GLUCOSE SERPL-MCNC: 100 MG/DL (ref 65–99)
HCT VFR BLD AUTO: 42.3 % (ref 37.5–51)
HDLC SERPL-MCNC: 51 MG/DL (ref 40–60)
HGB BLD-MCNC: 14.1 G/DL (ref 13–17.7)
IMM GRANULOCYTES # BLD AUTO: 0.01 10*3/MM3 (ref 0–0.05)
IMM GRANULOCYTES NFR BLD AUTO: 0.2 % (ref 0–0.5)
LDLC SERPL CALC-MCNC: 148 MG/DL (ref 0–100)
LYMPHOCYTES # BLD AUTO: 1.65 10*3/MM3 (ref 0.7–3.1)
LYMPHOCYTES NFR BLD AUTO: 30.8 % (ref 19.6–45.3)
MCH RBC QN AUTO: 28.8 PG (ref 26.6–33)
MCHC RBC AUTO-ENTMCNC: 33.3 G/DL (ref 31.5–35.7)
MCV RBC AUTO: 86.5 FL (ref 79–97)
MONOCYTES # BLD AUTO: 0.45 10*3/MM3 (ref 0.1–0.9)
MONOCYTES NFR BLD AUTO: 8.4 % (ref 5–12)
NEUTROPHILS # BLD AUTO: 2.88 10*3/MM3 (ref 1.7–7)
NEUTROPHILS NFR BLD AUTO: 53.7 % (ref 42.7–76)
NRBC BLD AUTO-RTO: 0 /100 WBC (ref 0–0.2)
PLATELET # BLD AUTO: 277 10*3/MM3 (ref 140–450)
POTASSIUM SERPL-SCNC: 4.5 MMOL/L (ref 3.5–5.2)
PROT SERPL-MCNC: 6.5 G/DL (ref 6–8.5)
PTH-INTACT SERPL-MCNC: 13 PG/ML (ref 15–65)
RBC # BLD AUTO: 4.89 10*6/MM3 (ref 4.14–5.8)
SODIUM SERPL-SCNC: 139 MMOL/L (ref 136–145)
TRIGL SERPL-MCNC: 88 MG/DL (ref 0–150)
VLDLC SERPL CALC-MCNC: 16 MG/DL (ref 5–40)
WBC # BLD AUTO: 5.36 10*3/MM3 (ref 3.4–10.8)

## 2024-06-04 RX ORDER — ERGOCALCIFEROL 1.25 MG/1
50000 CAPSULE ORAL WEEKLY
Qty: 8 CAPSULE | Refills: 0 | Status: SHIPPED | OUTPATIENT
Start: 2024-06-04 | End: 2024-08-03

## 2024-07-16 ENCOUNTER — TELEPHONE (OUTPATIENT)
Dept: FAMILY MEDICINE CLINIC | Facility: CLINIC | Age: 29
End: 2024-07-16

## 2024-07-16 NOTE — TELEPHONE ENCOUNTER
Caller: Levon Long    Relationship: Self    Best call back number: [phone]     What was the call regarding: PATIENT STATED HE FINISHED THE WEEKLY VITAMIN D SUPPLEMENT THAT WAS PRESCRIBED YESTERDAY. PATIENT STATED THAT HE WAS ADVISED TO GET AN OVER THE COUNTER VITAMIN D SUPPLEMENT AFTER FINISHING, AND HE WOULD LIKE TO KNOW WHAT DOSAGE HE SHOULD GET. PLEASE ADVISE.

## 2025-02-03 ENCOUNTER — OFFICE VISIT (OUTPATIENT)
Dept: FAMILY MEDICINE CLINIC | Facility: CLINIC | Age: 30
End: 2025-02-03
Payer: COMMERCIAL

## 2025-02-03 VITALS
TEMPERATURE: 98 F | SYSTOLIC BLOOD PRESSURE: 122 MMHG | HEART RATE: 81 BPM | DIASTOLIC BLOOD PRESSURE: 72 MMHG | HEIGHT: 75 IN | WEIGHT: 292 LBS | RESPIRATION RATE: 18 BRPM | OXYGEN SATURATION: 96 % | BODY MASS INDEX: 36.31 KG/M2

## 2025-02-03 DIAGNOSIS — R50.9 FEVER, UNSPECIFIED FEVER CAUSE: ICD-10-CM

## 2025-02-03 DIAGNOSIS — J02.9 SORE THROAT: ICD-10-CM

## 2025-02-03 DIAGNOSIS — H66.001 RIGHT ACUTE SUPPURATIVE OTITIS MEDIA: Primary | ICD-10-CM

## 2025-02-03 LAB
EXPIRATION DATE: NORMAL
EXPIRATION DATE: NORMAL
FLUAV AG UPPER RESP QL IA.RAPID: NOT DETECTED
FLUBV AG UPPER RESP QL IA.RAPID: NOT DETECTED
INTERNAL CONTROL: NORMAL
INTERNAL CONTROL: NORMAL
Lab: NORMAL
Lab: NORMAL
S PYO AG THROAT QL: NEGATIVE
SARS-COV-2 AG UPPER RESP QL IA.RAPID: NOT DETECTED

## 2025-02-03 PROCEDURE — 87880 STREP A ASSAY W/OPTIC: CPT | Performed by: NURSE PRACTITIONER

## 2025-02-03 PROCEDURE — 99213 OFFICE O/P EST LOW 20 MIN: CPT | Performed by: NURSE PRACTITIONER

## 2025-02-03 PROCEDURE — 87428 SARSCOV & INF VIR A&B AG IA: CPT | Performed by: NURSE PRACTITIONER

## 2025-02-03 NOTE — PROGRESS NOTES
Subjective   Levon Long is a 29 y.o. male.     Cough  Associated symptoms include a sore throat.   Sore Throat   Associated symptoms include coughing.        The following portions of the patient's history were reviewed and updated as appropriate: allergies, current medications, past family history, past medical history, past social history, past surgical history and problem list.       The patient is a 29-year-old male who presents for a sick visit.    Acute sick visit x 4 days. He reports experiencing a fever over the weekend, although he did not measure it with a thermometer. His Oura ring indicated a temperature elevation of approximately 4 degrees, suggesting a fever of around 101 or 102 degrees. The onset of his symptoms was on Friday night, marking today as the fourth day of his illness. He has been managing his symptoms with ibuprofen, DayQuil, and NyQuil, and has been able to sleep both at night and during the day. He is employed in [employer] and has taken the day off due to his illness. He reports no recent exposure to individuals with influenza or COVID-19. He reports excessive sweating, particularly at night, and fatigue. He does not smoke or vape but admits to using nicotine pouches. He has not been on any antibiotics recently and has no history of asthma.    He also reports a productive cough, which was initially constant for the first two days but has since decreased in frequency. He does not take any allergy medications such as Claritin or loratadine.    He has a history of ear infections but currently reports no ear pain.    SOCIAL HISTORY  He does not smoke or vape currently but uses nicotine pouches. He is employed in [employer].    MEDICATIONS  ibuprofen, DayQuil, NyQuil         Review of Systems   HENT:  Positive for sore throat.    Respiratory:  Positive for cough.            Current Outpatient Medications:     amoxicillin-clavulanate (AUGMENTIN) 875-125 MG per tablet, Take 1 tablet by mouth 2  (Two) Times a Day for 5 days., Disp: 10 tablet, Rfl: 0    busPIRone (BUSPAR) 10 MG tablet, Take 1 tablet by mouth 2 (Two) Times a Day for 90 days., Disp: 180 tablet, Rfl: 3    Objective   Physical Exam  Constitutional:       Appearance: Normal appearance.   HENT:      Right Ear: Tympanic membrane normal. Tympanic membrane is injected.      Left Ear: Tympanic membrane normal.      Nose: Nose normal.      Mouth/Throat:      Mouth: Mucous membranes are moist.   Cardiovascular:      Rate and Rhythm: Normal rate and regular rhythm.      Pulses: Normal pulses.      Heart sounds: Normal heart sounds.   Pulmonary:      Effort: Pulmonary effort is normal.      Breath sounds: Normal breath sounds. Examination of the right-lower field reveals decreased breath sounds. Examination of the left-lower field reveals decreased breath sounds. No wheezing or rhonchi.   Abdominal:      General: Bowel sounds are normal.   Skin:     General: Skin is warm and moist.      Findings: No rash.      Comments: Diaphoresis     Neurological:      General: No focal deficit present.      Mental Status: He is alert.         Vitals:    02/03/25 1048   BP:    Pulse:    Resp:    Temp: 98 °F (36.7 °C)   SpO2:      Body mass index is 36.5 kg/m².    Procedures    TSH   Date Value Ref Range Status   06/01/2023 1.640 0.270 - 4.200 uIU/mL Final            Over the past 2 weeks, how often have you been bothered by any of the following problems?  Little interest or pleasure in doing things: Not at all  Feeling down, depressed, or hopeless: Not at all      Assessment & Plan   Problems Addressed this Visit    None  Visit Diagnoses       Right acute suppurative otitis media    -  Primary    Relevant Orders    POCT SARS-CoV-2 Antigen SAUD + Flu (Completed)    Sore throat        Relevant Orders    POCT rapid strep A (Completed)    Fever, unspecified fever cause              Diagnoses         Codes Comments    Right acute suppurative otitis media    -  Primary  ICD-10-CM: H66.001  ICD-9-CM: 382.00     Sore throat     ICD-10-CM: J02.9  ICD-9-CM: 462     Fever, unspecified fever cause     ICD-10-CM: R50.9  ICD-9-CM: 780.60                  1. Right ear infection.  His right ear appears inflamed, and he has a history of ear infections.  influenza test is negative, the right ear infection will be treated. A prescription for Augmentin 875 mg, to be taken twice daily with food for 5 days, will be sent to [facility] pharmacy. He is advised to consume yogurt or similar foods to mitigate potential gastrointestinal side effects.    3. Productive cough.  He reports a productive cough with significant phlegm. Mucinex is recommended to help clear the mucus, and he is advised to drink plenty of water. If fever persists or worsening after 2 days of abx, call or go to ER              Education provided in AVS   Return if symptoms worsen or fail to improve.    Patient or patient representative verbalized consent for the use of Ambient Listening during the visit with  JOSE Kendrick for chart documentation. 2/3/2025  12:49 EST

## 2025-06-04 ENCOUNTER — OFFICE VISIT (OUTPATIENT)
Dept: FAMILY MEDICINE CLINIC | Facility: CLINIC | Age: 30
End: 2025-06-04
Payer: COMMERCIAL

## 2025-06-04 VITALS
DIASTOLIC BLOOD PRESSURE: 70 MMHG | SYSTOLIC BLOOD PRESSURE: 116 MMHG | RESPIRATION RATE: 18 BRPM | OXYGEN SATURATION: 98 % | BODY MASS INDEX: 34.69 KG/M2 | HEIGHT: 75 IN | WEIGHT: 279 LBS | HEART RATE: 72 BPM

## 2025-06-04 DIAGNOSIS — F41.1 GAD (GENERALIZED ANXIETY DISORDER): ICD-10-CM

## 2025-06-04 DIAGNOSIS — R74.8 ELEVATED LIVER ENZYMES: ICD-10-CM

## 2025-06-04 DIAGNOSIS — F17.200 NICOTINE DEPENDENCE, UNCOMPLICATED, UNSPECIFIED NICOTINE PRODUCT TYPE: ICD-10-CM

## 2025-06-04 DIAGNOSIS — S86.899A ANTERIOR SHIN SPLINTS: ICD-10-CM

## 2025-06-04 DIAGNOSIS — Z00.00 ANNUAL PHYSICAL EXAM: Primary | ICD-10-CM

## 2025-06-04 LAB
ALBUMIN SERPL-MCNC: 4.8 G/DL (ref 3.5–5.2)
ALBUMIN/GLOB SERPL: 1.8 G/DL
ALP SERPL-CCNC: 57 U/L (ref 39–117)
ALT SERPL-CCNC: 33 U/L (ref 1–41)
AST SERPL-CCNC: 26 U/L (ref 1–40)
BASOPHILS # BLD AUTO: 0.05 10*3/MM3 (ref 0–0.2)
BASOPHILS NFR BLD AUTO: 0.7 % (ref 0–1.5)
BILIRUB SERPL-MCNC: 0.4 MG/DL (ref 0–1.2)
BUN SERPL-MCNC: 16 MG/DL (ref 6–20)
BUN/CREAT SERPL: 10.7 (ref 7–25)
CALCIUM SERPL-MCNC: 11 MG/DL (ref 8.6–10.5)
CHLORIDE SERPL-SCNC: 99 MMOL/L (ref 98–107)
CHOLEST SERPL-MCNC: 216 MG/DL (ref 0–200)
CHOLEST/HDLC SERPL: 4.15 {RATIO}
CO2 SERPL-SCNC: 28.2 MMOL/L (ref 22–29)
CREAT SERPL-MCNC: 1.5 MG/DL (ref 0.76–1.27)
EGFRCR SERPLBLD CKD-EPI 2021: 63.8 ML/MIN/1.73
EOSINOPHIL # BLD AUTO: 0.23 10*3/MM3 (ref 0–0.4)
EOSINOPHIL NFR BLD AUTO: 3.4 % (ref 0.3–6.2)
ERYTHROCYTE [DISTWIDTH] IN BLOOD BY AUTOMATED COUNT: 12.2 % (ref 12.3–15.4)
GLOBULIN SER CALC-MCNC: 2.6 GM/DL
GLUCOSE SERPL-MCNC: 59 MG/DL (ref 65–99)
HCT VFR BLD AUTO: 44 % (ref 37.5–51)
HDLC SERPL-MCNC: 52 MG/DL (ref 40–60)
HGB BLD-MCNC: 14.7 G/DL (ref 13–17.7)
IMM GRANULOCYTES # BLD AUTO: 0.03 10*3/MM3 (ref 0–0.05)
IMM GRANULOCYTES NFR BLD AUTO: 0.4 % (ref 0–0.5)
LDLC SERPL CALC-MCNC: 146 MG/DL (ref 0–100)
LYMPHOCYTES # BLD AUTO: 1.55 10*3/MM3 (ref 0.7–3.1)
LYMPHOCYTES NFR BLD AUTO: 23.1 % (ref 19.6–45.3)
MCH RBC QN AUTO: 29.1 PG (ref 26.6–33)
MCHC RBC AUTO-ENTMCNC: 33.4 G/DL (ref 31.5–35.7)
MCV RBC AUTO: 87.1 FL (ref 79–97)
MONOCYTES # BLD AUTO: 0.74 10*3/MM3 (ref 0.1–0.9)
MONOCYTES NFR BLD AUTO: 11 % (ref 5–12)
NEUTROPHILS # BLD AUTO: 4.1 10*3/MM3 (ref 1.7–7)
NEUTROPHILS NFR BLD AUTO: 61.4 % (ref 42.7–76)
NRBC BLD AUTO-RTO: 0 /100 WBC (ref 0–0.2)
PLATELET # BLD AUTO: 266 10*3/MM3 (ref 140–450)
POTASSIUM SERPL-SCNC: 4.4 MMOL/L (ref 3.5–5.2)
PROT SERPL-MCNC: 7.4 G/DL (ref 6–8.5)
RBC # BLD AUTO: 5.05 10*6/MM3 (ref 4.14–5.8)
SODIUM SERPL-SCNC: 137 MMOL/L (ref 136–145)
TRIGL SERPL-MCNC: 98 MG/DL (ref 0–150)
VLDLC SERPL CALC-MCNC: 18 MG/DL (ref 5–40)
WBC # BLD AUTO: 6.7 10*3/MM3 (ref 3.4–10.8)

## 2025-06-04 RX ORDER — BUSPIRONE HYDROCHLORIDE 10 MG/1
10 TABLET ORAL 2 TIMES DAILY
Qty: 180 TABLET | Refills: 3 | Status: SHIPPED | OUTPATIENT
Start: 2025-06-04 | End: 2025-09-02

## 2025-06-04 NOTE — PROGRESS NOTES
Subjective   Levon Long is a 30 y.o. male.   Patient here for annual physical exam, labs, chronic illness management and updated screening.      History of Present Illness     The following portions of the patient's history were reviewed and updated as appropriate: allergies, current medications, past family history, past medical history, past social history, past surgical history and problem list.       The patient is a 30-year-old male who presents for an annual exam.    He reports no significant health concerns, with the exception of shin splints that occur during running activities. These resolve within a day or two. He engages in kettlebell workouts at his gym and has recently started running on hard surfaces in the parking lot. He alternates between kettlebell classes on Tuesdays and Thursdays and jiu-jitsu on Mondays, Wednesdays, and Fridays.  He maintains a healthy diet and adequate hydration. He takes daily creatine supplements (5 mg) and ensures sufficient water intake throughout the day.    He is currently on buspirone for anxiety, which he finds beneficial in managing work-related stress. Sleeps well.    He reports no changes in his medical history or family history. He does not require sexually transmitted disease testing at this time. He experiences  nasal congestion but does not use Flonase. He sees a dentist regularly every 6 months. He visits a dermatologist annually.  He reports normal bowel movements, typically once a day, without constipation or diarrhea. He uses an Oura ring to monitor his sleep and stress levels, which he finds helpful. His sleep quality is generally good, with scores ranging from 85 to 95 most nights. He reports no respiratory symptoms such as shortness of breath, chest pain, or palpitations. He also reports no testicular pain, swelling, or urinary issues. He reports no other pain apart from his shins.    History of elevated liver enzymes > 1 year. He consumes one beer  daily after work, and two to three beers or more on weekends. He has a wedding to attend this weekend and anticipates consuming more than his usual amount of alcohol. No history of Hepatitis. No abdominal pain.     He has been taking vitamin D3 supplements since his last visit for vit D deficiency.     SOCIAL HISTORY  He does not smoke but uses nicotine pouches. He does not vape. He drinks alcohol, typically one beer every night after work and two to three beers on Friday and Saturday nights.    FAMILY HISTORY  His father had melanoma. His mother has had some skin issues removed.       Review of Systems   Constitutional:  Negative for activity change and unexpected weight loss.   HENT:  Negative for congestion.         Dental exam is utd      Eyes:  Negative for visual disturbance.        Eye exam is utd      Respiratory:  Negative for cough, shortness of breath and wheezing.    Cardiovascular:  Negative for chest pain, palpitations and leg swelling.   Gastrointestinal:  Negative for abdominal distention, abdominal pain, constipation, diarrhea and GERD.   Endocrine: Negative for cold intolerance and heat intolerance.   Genitourinary:  Negative for dysuria, scrotal swelling, testicular pain and urinary incontinence.   Musculoskeletal:  Positive for myalgias (bilat shins).   Skin:         Sees derm     Allergic/Immunologic: Positive for environmental allergies.   Neurological:  Negative for weakness and headache.   Hematological:  Does not bruise/bleed easily.   Psychiatric/Behavioral:  Negative for sleep disturbance and depressed mood. The patient is nervous/anxious (stable on meds).          Current Outpatient Medications:     busPIRone (BUSPAR) 10 MG tablet, Take 1 tablet by mouth 2 (Two) Times a Day for 90 days., Disp: 180 tablet, Rfl: 3    Objective   Physical Exam  Vitals reviewed.   Constitutional:       Appearance: Normal appearance. He is obese.   HENT:      Head: Normocephalic.      Right Ear: Tympanic  membrane normal.      Left Ear: Tympanic membrane normal.      Nose: Nose normal.      Mouth/Throat:      Mouth: Mucous membranes are moist.   Eyes:      Pupils: Pupils are equal, round, and reactive to light.   Cardiovascular:      Rate and Rhythm: Normal rate and regular rhythm.      Pulses: Normal pulses.      Heart sounds: Normal heart sounds.   Pulmonary:      Effort: Pulmonary effort is normal.      Breath sounds: Normal breath sounds.   Abdominal:      General: Bowel sounds are normal.      Palpations: Abdomen is soft.   Musculoskeletal:         General: Normal range of motion.      Cervical back: Normal range of motion.   Skin:     General: Skin is warm.   Neurological:      General: No focal deficit present.      Mental Status: He is alert.   Psychiatric:         Mood and Affect: Mood is anxious.      Comments: Stable on meds           Vitals:    06/04/25 0755   BP: 116/70   Pulse: 72   Resp: 18   SpO2: 98%     Body mass index is 34.87 kg/m².    Procedures    TSH   Date Value Ref Range Status   06/01/2023 1.640 0.270 - 4.200 uIU/mL Final                   Assessment & Plan   Problems Addressed this Visit       Nicotine dependence    Relevant Medications    busPIRone (BUSPAR) 10 MG tablet    BENNY (generalized anxiety disorder)    Relevant Medications    busPIRone (BUSPAR) 10 MG tablet     Other Visit Diagnoses         Annual physical exam    -  Primary    Relevant Orders    Comprehensive Metabolic Panel    CBC & Differential    Lipid Panel With / Chol / HDL Ratio      Anterior shin splints        Relevant Medications    busPIRone (BUSPAR) 10 MG tablet      Elevated liver enzymes        Relevant Orders    Hepatitis Panel, Acute          Diagnoses         Codes Comments      Annual physical exam    -  Primary ICD-10-CM: Z00.00  ICD-9-CM: V70.0       BENNY (generalized anxiety disorder)     ICD-10-CM: F41.1  ICD-9-CM: 300.02       Anterior shin splints     ICD-10-CM: S86.899A  ICD-9-CM: 844.9       Nicotine  dependence, uncomplicated, unspecified nicotine product type     ICD-10-CM: F17.200  ICD-9-CM: 305.1       Elevated liver enzymes     ICD-10-CM: R74.8  ICD-9-CM: 790.5           Orders Placed This Encounter   Procedures    Comprehensive Metabolic Panel     Release to patient:   Routine Release [[id number]]    Lipid Panel With / Chol / HDL Ratio     Release to patient:   Routine Release [[id number]]    Hepatitis Panel, Acute     Release to patient:   Routine Release [[id number]]    CBC & Differential     Release to patient:   Routine Release [[id number]]       Assessment & Plan  1. Shin splints.  - Advised to perform toe raises prior to workouts to adequately warm up shins.  - Suggested use of KT tape, which can be purchased online and applied following instructional videos on YouTube.  - Recommended dry needling as a potential treatment option.  - Encouraged to maintain hydration, apply over-the-counter anti-inflammatory rubs such as IcyHot, Bengay, or Voltaren gel, and consider massage therapy.    2. Anxiety.  - Reported that buspirone has been effective in managing anxiety symptoms, making him feel less irritable and better able to cope with stress at work.  - No notable anxiety symptoms were observed.  - Discussed the effectiveness of current medication regimen.  - Will continue current medication regimen.    3. Elevated liver enzymes.  - Liver enzymes were previously elevated, potentially due to alcohol consumption or fatty liver infiltration.  - Advised to reduce alcohol intake and consider substituting with non-alcoholic beverages such as herbal tea, kombucha, bubbly water, or mocktails.  - Recheck of liver enzymes will be conducted today.  - If results remain elevated, a hepatitis panel will be ordered, and an ultrasound of the liver will be considered to assess its texture and identify any potential irritants.    4. Health maintenance.  - Advised to receive COVID-19 and influenza vaccines in the fall.  -  Recommended annual physical examinations and regular lab tests.  - Suggested colon cancer screening at age 45, followed by prostate cancer screening at age 50 through blood work.  - For nasal allergies, advised to use over-the-counter medications such as Flonase, Zyrtec, Allegra, or Claritin.  - Advised to apply sunscreen regularly.    5. Low vitamin D.  - Will continue taking vitamin D3 supplements.      Preventative care- Follow heart healthy diet, drink water, walk daily. Wear seatbelts, wear helmets, wear sunscreens. Follow CDC guidelines for covid and flu .          Education provided in AVS   Return in about 1 year (around 6/4/2026).    Patient or patient representative verbalized consent for the use of Ambient Listening during the visit with  JOSE Kendrick for chart documentation. 6/4/2025  09:45 EDT

## 2025-06-05 LAB
HAV IGM SERPL QL IA: NEGATIVE
HBV CORE IGM SERPL QL IA: NEGATIVE
HBV SURFACE AG SERPL QL IA: NEGATIVE
HCV AB SERPL QL IA: NON REACTIVE
HCV AB SERPL QL IA: NORMAL